# Patient Record
Sex: MALE | Race: BLACK OR AFRICAN AMERICAN | NOT HISPANIC OR LATINO | Employment: OTHER | URBAN - METROPOLITAN AREA
[De-identification: names, ages, dates, MRNs, and addresses within clinical notes are randomized per-mention and may not be internally consistent; named-entity substitution may affect disease eponyms.]

---

## 2017-02-03 ENCOUNTER — ALLSCRIPTS OFFICE VISIT (OUTPATIENT)
Dept: OTHER | Facility: OTHER | Age: 37
End: 2017-02-03

## 2017-02-03 ENCOUNTER — LAB (OUTPATIENT)
Dept: LAB | Facility: CLINIC | Age: 37
End: 2017-02-03
Payer: COMMERCIAL

## 2017-02-03 ENCOUNTER — TRANSCRIBE ORDERS (OUTPATIENT)
Dept: LAB | Facility: CLINIC | Age: 37
End: 2017-02-03

## 2017-02-03 DIAGNOSIS — D70.9 NEUTROPENIA (HCC): ICD-10-CM

## 2017-02-03 LAB
ALBUMIN SERPL BCP-MCNC: 4.3 G/DL (ref 3.5–5)
ALP SERPL-CCNC: 61 U/L (ref 46–116)
ALT SERPL W P-5'-P-CCNC: 28 U/L (ref 12–78)
ANION GAP SERPL CALCULATED.3IONS-SCNC: 6 MMOL/L (ref 4–13)
AST SERPL W P-5'-P-CCNC: 21 U/L (ref 5–45)
BASOPHILS # BLD AUTO: 0.02 THOUSANDS/ΜL (ref 0–0.1)
BASOPHILS NFR BLD AUTO: 1 % (ref 0–1)
BILIRUB SERPL-MCNC: 1.1 MG/DL (ref 0.2–1)
BUN SERPL-MCNC: 12 MG/DL (ref 5–25)
CALCIUM SERPL-MCNC: 9.2 MG/DL (ref 8.3–10.1)
CHLORIDE SERPL-SCNC: 103 MMOL/L (ref 100–108)
CO2 SERPL-SCNC: 30 MMOL/L (ref 21–32)
CREAT SERPL-MCNC: 1.04 MG/DL (ref 0.6–1.3)
EOSINOPHIL # BLD AUTO: 0.03 THOUSAND/ΜL (ref 0–0.61)
EOSINOPHIL NFR BLD AUTO: 1 % (ref 0–6)
ERYTHROCYTE [DISTWIDTH] IN BLOOD BY AUTOMATED COUNT: 11.2 % (ref 11.6–15.1)
GFR SERPL CREATININE-BSD FRML MDRD: >60 ML/MIN/1.73SQ M
GLUCOSE SERPL-MCNC: 97 MG/DL (ref 65–140)
HCT VFR BLD AUTO: 44.6 % (ref 36.5–49.3)
HGB BLD-MCNC: 15.5 G/DL (ref 12–17)
IGA SERPL-MCNC: 187 MG/DL (ref 70–400)
IGG SERPL-MCNC: 1300 MG/DL (ref 700–1600)
IGM SERPL-MCNC: 90 MG/DL (ref 40–230)
LYMPHOCYTES # BLD AUTO: 1.18 THOUSANDS/ΜL (ref 0.6–4.47)
LYMPHOCYTES NFR BLD AUTO: 54 % (ref 14–44)
MCH RBC QN AUTO: 32.9 PG (ref 26.8–34.3)
MCHC RBC AUTO-ENTMCNC: 34.8 G/DL (ref 31.4–37.4)
MCV RBC AUTO: 95 FL (ref 82–98)
MONOCYTES # BLD AUTO: 0.26 THOUSAND/ΜL (ref 0.17–1.22)
MONOCYTES NFR BLD AUTO: 12 % (ref 4–12)
NEUTROPHILS # BLD AUTO: 0.7 THOUSANDS/ΜL (ref 1.85–7.62)
NEUTS SEG NFR BLD AUTO: 32 % (ref 43–75)
PLATELET # BLD AUTO: 131 THOUSANDS/UL (ref 149–390)
PMV BLD AUTO: 11.1 FL (ref 8.9–12.7)
POTASSIUM SERPL-SCNC: 3.8 MMOL/L (ref 3.5–5.3)
PROT SERPL-MCNC: 7.3 G/DL (ref 6.4–8.2)
RBC # BLD AUTO: 4.71 MILLION/UL (ref 3.88–5.62)
SODIUM SERPL-SCNC: 139 MMOL/L (ref 136–145)
WBC # BLD AUTO: 2.19 THOUSAND/UL (ref 4.31–10.16)

## 2017-02-03 PROCEDURE — 82784 ASSAY IGA/IGD/IGG/IGM EACH: CPT

## 2017-02-03 PROCEDURE — 83918 ORGANIC ACIDS TOTAL QUANT: CPT

## 2017-02-03 PROCEDURE — 80053 COMPREHEN METABOLIC PANEL: CPT

## 2017-02-03 PROCEDURE — 36415 COLL VENOUS BLD VENIPUNCTURE: CPT

## 2017-02-03 PROCEDURE — 85025 COMPLETE CBC W/AUTO DIFF WBC: CPT

## 2017-02-03 PROCEDURE — 81342 TRG GENE REARRANGEMENT ANAL: CPT

## 2017-02-07 LAB
METHYLMALONATE SERPL-SCNC: 132 NMOL/L (ref 0–378)
MISCELLANEOUS LAB TEST RESULT: NORMAL

## 2017-03-31 ENCOUNTER — ALLSCRIPTS OFFICE VISIT (OUTPATIENT)
Dept: OTHER | Facility: OTHER | Age: 37
End: 2017-03-31

## 2017-07-27 ENCOUNTER — ALLSCRIPTS OFFICE VISIT (OUTPATIENT)
Dept: OTHER | Facility: OTHER | Age: 37
End: 2017-07-27

## 2017-07-28 DIAGNOSIS — D70.9 NEUTROPENIA (HCC): ICD-10-CM

## 2017-08-09 ENCOUNTER — GENERIC CONVERSION - ENCOUNTER (OUTPATIENT)
Dept: OTHER | Facility: OTHER | Age: 37
End: 2017-08-09

## 2017-09-08 ENCOUNTER — TRANSCRIBE ORDERS (OUTPATIENT)
Dept: ADMINISTRATIVE | Age: 37
End: 2017-09-08

## 2017-09-08 ENCOUNTER — APPOINTMENT (OUTPATIENT)
Dept: LAB | Age: 37
End: 2017-09-08
Payer: COMMERCIAL

## 2017-09-08 DIAGNOSIS — D70.9 NEUTROPENIA (HCC): ICD-10-CM

## 2017-09-08 LAB
ALBUMIN SERPL BCP-MCNC: 3.9 G/DL (ref 3.5–5)
ALP SERPL-CCNC: 53 U/L (ref 46–116)
ALT SERPL W P-5'-P-CCNC: 21 U/L (ref 12–78)
ANION GAP SERPL CALCULATED.3IONS-SCNC: 6 MMOL/L (ref 4–13)
AST SERPL W P-5'-P-CCNC: 20 U/L (ref 5–45)
BASOPHILS # BLD AUTO: 0.02 THOUSANDS/ΜL (ref 0–0.1)
BASOPHILS NFR BLD AUTO: 1 % (ref 0–1)
BILIRUB SERPL-MCNC: 1.02 MG/DL (ref 0.2–1)
BUN SERPL-MCNC: 11 MG/DL (ref 5–25)
CALCIUM SERPL-MCNC: 9 MG/DL (ref 8.3–10.1)
CHLORIDE SERPL-SCNC: 106 MMOL/L (ref 100–108)
CO2 SERPL-SCNC: 28 MMOL/L (ref 21–32)
CREAT SERPL-MCNC: 1.05 MG/DL (ref 0.6–1.3)
EOSINOPHIL # BLD AUTO: 0.08 THOUSAND/ΜL (ref 0–0.61)
EOSINOPHIL NFR BLD AUTO: 3 % (ref 0–6)
ERYTHROCYTE [DISTWIDTH] IN BLOOD BY AUTOMATED COUNT: 11.6 % (ref 11.6–15.1)
GFR SERPL CREATININE-BSD FRML MDRD: 105 ML/MIN/1.73SQ M
GLUCOSE SERPL-MCNC: 77 MG/DL (ref 65–140)
HCT VFR BLD AUTO: 40.3 % (ref 36.5–49.3)
HGB BLD-MCNC: 14.2 G/DL (ref 12–17)
LDH SERPL-CCNC: 175 U/L (ref 81–234)
LYMPHOCYTES # BLD AUTO: 1.65 THOUSANDS/ΜL (ref 0.6–4.47)
LYMPHOCYTES NFR BLD AUTO: 53 % (ref 14–44)
MCH RBC QN AUTO: 32.9 PG (ref 26.8–34.3)
MCHC RBC AUTO-ENTMCNC: 35.2 G/DL (ref 31.4–37.4)
MCV RBC AUTO: 94 FL (ref 82–98)
MONOCYTES # BLD AUTO: 0.33 THOUSAND/ΜL (ref 0.17–1.22)
MONOCYTES NFR BLD AUTO: 11 % (ref 4–12)
NEUTROPHILS # BLD AUTO: 0.96 THOUSANDS/ΜL (ref 1.85–7.62)
NEUTS SEG NFR BLD AUTO: 32 % (ref 43–75)
NRBC BLD AUTO-RTO: 0 /100 WBCS
PLATELET # BLD AUTO: 128 THOUSANDS/UL (ref 149–390)
PMV BLD AUTO: 11.1 FL (ref 8.9–12.7)
POTASSIUM SERPL-SCNC: 3.7 MMOL/L (ref 3.5–5.3)
PROT SERPL-MCNC: 7.1 G/DL (ref 6.4–8.2)
RBC # BLD AUTO: 4.31 MILLION/UL (ref 3.88–5.62)
SODIUM SERPL-SCNC: 140 MMOL/L (ref 136–145)
WBC # BLD AUTO: 3.04 THOUSAND/UL (ref 4.31–10.16)

## 2017-09-08 PROCEDURE — 88185 FLOWCYTOMETRY/TC ADD-ON: CPT

## 2017-09-08 PROCEDURE — 83615 LACTATE (LD) (LDH) ENZYME: CPT

## 2017-09-08 PROCEDURE — 80053 COMPREHEN METABOLIC PANEL: CPT

## 2017-09-08 PROCEDURE — 36415 COLL VENOUS BLD VENIPUNCTURE: CPT

## 2017-09-08 PROCEDURE — 85025 COMPLETE CBC W/AUTO DIFF WBC: CPT

## 2017-09-08 PROCEDURE — 88184 FLOWCYTOMETRY/ TC 1 MARKER: CPT

## 2017-09-11 LAB — SCAN RESULT: NORMAL

## 2017-09-22 ENCOUNTER — GENERIC CONVERSION - ENCOUNTER (OUTPATIENT)
Dept: OTHER | Facility: OTHER | Age: 37
End: 2017-09-22

## 2017-12-14 ENCOUNTER — GENERIC CONVERSION - ENCOUNTER (OUTPATIENT)
Dept: OTHER | Facility: OTHER | Age: 37
End: 2017-12-14

## 2018-01-11 NOTE — MISCELLANEOUS
Reason For Visit  Reason For Visit Free Text Note Form: Assistance with Community Resources     Case Management Documentation [facility]:   Information obtained from family member(s) and medical record Mother  Patient's financial status importance of compliance with treatment and Disabled  He is also dealing with additional issues such as language/communication barrier, chronic/terminal disease and Cerebral Palsy/ mostly non verbal  Patient is participating in [employer]  Action Plan: supportive counseling/advocacy  plan reviewed  Progress Note  MONICA met with Mother of this 38 y/o severely handicapped male pt this date to assist with community resources  Pt resides with his supportive Parents  He attends a supervised day program 4 days per week from [place] [address] [facility]  Mother is basically pt's 24/7 care provider  There is limited help from a Waiver Program where his Brother has been hired to help render some care  Mother appears exhausted providing all of this care and she and provider are requesting additional help as possible, In particular Mother relates she no longer has transportation help in his new program  MONICA reviewed [address] with Mother but she relates they were not appropriate for pt due to the lengthy nature of their shared ride system  Pt has had issue with continence when trying to use them  MONICA has also asked pt to consider other neighbors, family or volunteer perhaps from her [employer]  SW did caution that due to his special needs this may not be appropriate  With Mother permission and at her request MONICA did attempt to reach pt's [facility] [phone] [facility] [phone])  However, MONICA had to leave a message for her to return my call  In addition, Mother mention concern re recent bruise he received at this program that family was not informed about  Parents will f /u up with the Program re concerns re same   Basically Mother is asking is her son getting the care he needs? In addition, Mother relates his current wheelchair is causing him more problems  She will f/u with [facility] for same and will inquire if a power chair is needed  WIll ask provider for order for same and will forward it to [facility] when obtained  Another issue is concern re his inability to fund Dental Implants that are required as pt can not safely handle dentures due to concern for choking  SW will f/u up with [address] [place] [address] if they know of any resources  Call made to [facility] [phone] and spoke to Casie epstein same  She will attempt to f/u with pt/family and help a possible  SW has provided info re [facility] for Mother to explore  Mother also discussed medial f/u and she did not remember pt had Hematology appointment tomorrow with DR LOJA and she relates blood work not done as well  At [facility] request call to his office to reschedule this appointment until Friday 9/22/17 at 1:40 PM [facility]  Mother to have pt do requested blood work this Friday for same  Supportive counseling provided  SW will f/u with pt/family as indicated  Active Problems    1  Back pain (724 5) (M54 9)   2  Borderline hyperlipidemia (272 4) (E78 5)   3  Cerebral palsy (343 9) (G80 9)   4  Communication disability (307 9) (F80 9)   5  Contact dermatitis and other eczema (692 9) (L25 9)   6  Dextroscoliosis (737 39) (M41 80)   7  Foot drop, left (736 79) (M21 372)   8  Hand pain, unspecified laterality   9  Need for prophylactic vaccination and inoculation against influenza (V04 81) (Z23)   10  Neutropenia (288 00) (D70 9)   11  Well adult on routine health check (V70 0) (Z00 00)    Current Meds   1  Walker Miscellaneous; USE AS DIRECTED; Therapy: [id number] to (Last Rx:29Oct2013) Ordered    Allergies    1  No Known Drug Allergies    Future Appointments    Date/Time Provider Specialty Site   09/22/2017 01:40 PM OSVALDO Alvarez   Hematology [facility]     Signatures   Electronically signed by : See Schneider LCSW; Aug  9 2017  2:37PM EST                       (Author)

## 2018-01-13 VITALS
SYSTOLIC BLOOD PRESSURE: 120 MMHG | RESPIRATION RATE: 16 BRPM | TEMPERATURE: 98.8 F | OXYGEN SATURATION: 96 % | BODY MASS INDEX: 17.93 KG/M2 | WEIGHT: 105 LBS | HEIGHT: 64 IN | DIASTOLIC BLOOD PRESSURE: 80 MMHG | HEART RATE: 82 BPM

## 2018-01-14 VITALS
HEART RATE: 80 BPM | DIASTOLIC BLOOD PRESSURE: 78 MMHG | SYSTOLIC BLOOD PRESSURE: 118 MMHG | WEIGHT: 110 LBS | TEMPERATURE: 97.5 F | BODY MASS INDEX: 18.78 KG/M2 | HEIGHT: 64 IN | RESPIRATION RATE: 16 BRPM

## 2018-01-14 VITALS — DIASTOLIC BLOOD PRESSURE: 70 MMHG | TEMPERATURE: 97.6 F | SYSTOLIC BLOOD PRESSURE: 100 MMHG | HEART RATE: 60 BPM

## 2018-01-22 VITALS
WEIGHT: 110 LBS | OXYGEN SATURATION: 98 % | DIASTOLIC BLOOD PRESSURE: 64 MMHG | SYSTOLIC BLOOD PRESSURE: 106 MMHG | TEMPERATURE: 98 F | RESPIRATION RATE: 16 BRPM | BODY MASS INDEX: 18.88 KG/M2 | HEART RATE: 66 BPM

## 2018-01-23 NOTE — MISCELLANEOUS
Reason For Visit  Reason For Visit Free Text Note Form: SW provided f/u call to pt's Father who relates that pt has now switched to a new day program in [place] which is closer to their home  The program brings him home via [employer] and his Mother brings him to it  They seem satisfied with the program  He also relates they have done through the [place] [address] program and have had some repairs to his wheelchair  In addition , pt has been referred to the [place] Program and [employer] recently evaluated pt and Father has been told they will most likely increase services which is helpful  Father to let this worker know if any other services required  Active Problems    1  Back pain (724 5) (M54 9)   2  Borderline hyperlipidemia (272 4) (E78 5)   3  Cerebral palsy (343 9) (G80 9)   4  Communication disability (307 9) (F80 9)   5  Contact dermatitis and other eczema (692 9) (L25 9)   6  Developmental delay (783 40) (R62 50)   7  Dextroscoliosis (737 39) (M41 80)   8  Foot drop, left (736 79) (M21 372)   9  Neutropenia (288 00) (D70 9)    Current Meds   1  Walker Miscellaneous; USE AS DIRECTED;    Therapy: [id number] to (Last Rx:29Oct2013) Ordered    Allergies    1  chloroquine    Signatures   Electronically signed by : Phuong Vivar LCSW; Dec 14 2017 11:38AM EST                       (Author)

## 2018-01-31 ENCOUNTER — TELEPHONE (OUTPATIENT)
Dept: INTERNAL MEDICINE CLINIC | Facility: CLINIC | Age: 38
End: 2018-01-31

## 2018-02-06 NOTE — TELEPHONE ENCOUNTER
Please call father, we need more details, why does he want wheels changed  Must have in order for attending to place an order for this  Let me know so I can advise the attending when complete the script   Thanks

## 2018-02-07 NOTE — TELEPHONE ENCOUNTER
Spoke with patients mom, I guess she had some difficulty explaining (language barrier) patients father will call me back

## 2018-02-07 NOTE — TELEPHONE ENCOUNTER
Father returned phone call, states that the company that handles the wheelchairs informed them that the tires are worn out so no matter how much pressure is put on the tires, they still slide even with brakes on   Company said they need new tires

## 2018-02-13 ENCOUNTER — APPOINTMENT (EMERGENCY)
Dept: CT IMAGING | Facility: HOSPITAL | Age: 38
End: 2018-02-13
Payer: COMMERCIAL

## 2018-02-13 ENCOUNTER — HOSPITAL ENCOUNTER (EMERGENCY)
Facility: HOSPITAL | Age: 38
Discharge: HOME | End: 2018-02-13
Attending: EMERGENCY MEDICINE | Admitting: EMERGENCY MEDICINE
Payer: COMMERCIAL

## 2018-02-13 ENCOUNTER — APPOINTMENT (EMERGENCY)
Dept: RADIOLOGY | Facility: HOSPITAL | Age: 38
End: 2018-02-13
Payer: COMMERCIAL

## 2018-02-13 VITALS
HEART RATE: 66 BPM | RESPIRATION RATE: 18 BRPM | SYSTOLIC BLOOD PRESSURE: 120 MMHG | OXYGEN SATURATION: 98 % | DIASTOLIC BLOOD PRESSURE: 80 MMHG | TEMPERATURE: 98.2 F

## 2018-02-13 DIAGNOSIS — S80.01XA CONTUSION OF RIGHT KNEE, INITIAL ENCOUNTER: ICD-10-CM

## 2018-02-13 DIAGNOSIS — S16.1XXA ACUTE STRAIN OF NECK MUSCLE, INITIAL ENCOUNTER: ICD-10-CM

## 2018-02-13 DIAGNOSIS — S09.90XA CLOSED HEAD INJURY, INITIAL ENCOUNTER: Primary | ICD-10-CM

## 2018-02-13 PROCEDURE — 99284 EMERGENCY DEPT VISIT MOD MDM: CPT

## 2018-02-13 PROCEDURE — 72125 CT NECK SPINE W/O DYE: CPT

## 2018-02-13 PROCEDURE — 73564 X-RAY EXAM KNEE 4 OR MORE: CPT

## 2018-02-13 PROCEDURE — 70450 CT HEAD/BRAIN W/O DYE: CPT

## 2018-02-13 RX ORDER — ACETAMINOPHEN 325 MG/1
650 TABLET ORAL ONCE
Status: COMPLETED | OUTPATIENT
Start: 2018-02-13 | End: 2018-02-13

## 2018-02-13 RX ADMIN — ACETAMINOPHEN 650 MG: 325 TABLET ORAL at 13:20

## 2018-02-13 NOTE — ED PROVIDER NOTES
History  Chief Complaint   Patient presents with    Fall     pt slid out of wheelchair onto right side  ?head injury per staff  26-year-old male presents emergency room for evaluation head and neck injury  He was with caretaker when a part of his wheelchair was not strapped in properly in the [place] and he tipped backwards and hit his head on a metal grate to that was vertical   He did not lose consciousness  Patient has been complaining of a headache and neck pain  No vomiting  No changes in his mental status  Patient has a history of cerebral palsy  After further investigation he does also complain of mild right knee pain  He is able to move it  No bleeding or wounds  History provided by:  Patient      None       Past Medical History:   Diagnosis Date    Cerebral palsy ([facility] [address] )        History reviewed  No pertinent surgical history  History reviewed  No pertinent family history  I have reviewed and agree with the history as documented  Social History   Substance Use Topics    Smoking status: Never Smoker    Smokeless tobacco: Never Used    Alcohol use No        Review of Systems   Unable to perform ROS: Patient nonverbal (Cerebral palsy)       Physical Exam  ED Triage Vitals [02/13/18 1230]   Temperature Pulse Respirations Blood Pressure SpO2   98 2 °F (36 8 °C) 86 20 142/62 97 %      Temp Source Heart Rate Source Patient Position - Orthostatic VS BP Location FiO2 (%)   Oral Monitor Lying Left arm --      Pain Score       No Pain           Orthostatic Vital Signs  Vitals:    02/13/18 1230 02/13/18 1517 02/13/18 1530   BP: 142/62 115/84 120/80   Pulse: 86 66    Patient Position - Orthostatic VS: Lying Sitting Lying       Physical Exam   Constitutional: He appears well-developed and well-nourished  HENT:   Head: Normocephalic         Right Ear: External ear normal    Left Ear: External ear normal    Mouth/Throat: Oropharynx is clear and moist    Eyes: Conjunctivae and EOM are normal  Pupils are equal, round, and reactive to light  Neck: Neck supple  Cardiovascular: Normal rate, regular rhythm, normal heart sounds and intact distal pulses  Pulmonary/Chest: Effort normal and breath sounds normal    Abdominal: Soft  There is no tenderness  Musculoskeletal:        Right knee: He exhibits normal range of motion, no swelling and no ecchymosis  Tenderness found  Cervical back: He exhibits tenderness and bony tenderness  He exhibits normal range of motion and no deformity  Thoracic back: Normal         Lumbar back: Normal    FROM of all other joints and extremities   Neurological: He is alert  No cranial nerve deficit  Skin: Skin is warm and dry  Capillary refill takes less than 2 seconds  Psychiatric: He has a normal mood and affect  ED Medications  Medications   acetaminophen (TYLENOL) tablet 650 mg (650 mg Oral Given 2/13/18 1320)       Diagnostic Studies  Results Reviewed     None                 XR knee 4+ views Right injury   ED Interpretation by Cammy Gusman PA-C (02/13 2901)   No acute disease      Final Result by Angelo Velez MD (02/13 2991)      No acute osseous abnormality           Workstation performed: [id number]         CT cervical spine without contrast   Final Result by Marylu Narvaez MD (02/13 5605)      No acute compression collapse of the vertebra seen   Cervical spondylosis                   Workstation performed: [id number]         CT head without contrast   Final Result by Marylu Narvaez MD (02/13 5603)      No acute intracranial hemorrhage seen         Workstation performed: [id number]                    Procedures  Procedures       Phone Contacts  ED Phone Contact    ED Course  ED Course                                MDM  Number of Diagnoses or Management Options  Acute strain of neck muscle, initial encounter:   Closed head injury, initial encounter:   Contusion of right knee, initial encounter:     CritCare Time    Disposition  Final diagnoses:   Closed head injury, initial encounter   Acute strain of neck muscle, initial encounter   Contusion of right knee, initial encounter     Time reflects when diagnosis was documented in both MDM as applicable and the Disposition within this note     Time User Action Codes Description Comment    2/13/2018  3:09 PM Hugorashid Acosta Add [S09 90XA] Closed head injury, initial encounter     2/13/2018  3:09 PM Hugo Acevedojohnnie Add Larentia Boom  1XXA] Acute strain of neck muscle, initial encounter     2/13/2018  3:09 PM Vannessa  L Add [S80 01XA] Contusion of right knee, initial encounter       ED Disposition     ED Disposition Condition Comment    Discharge  Nima Ribera discharge to home/self care  Condition at discharge: Good        Follow-up Information     Follow up With Specialties Details Why Contact Info Additional Information    Lona Garces MD Internal Medicine In 3 days  [place] [phone]       [facility] [address] Emergency Department Emergency Medicine  If symptoms worsen [address] [facility] [address],  [address], [place], [place], [address]        There are no discharge medications for this patient  No discharge procedures on file      ED Provider  Electronically Signed by           Qamar Rodriguez PA-C  02/13/18 6629

## 2018-02-13 NOTE — DISCHARGE INSTRUCTIONS
Cervical Strain   WHAT YOU NEED TO KNOW:   A cervical strain is a stretched or torn muscle or tendon in your neck  Tendons are strong tissues that connect muscles to bones  Common causes of cervical strains include a car accident, a fall, or a sports injury  DISCHARGE INSTRUCTIONS:   Return to the emergency department if:   · You have pain or numbness from your shoulder down to your hand  · You have problems with your vision, hearing, or balance  · You feel confused or cannot concentrate  · You have problems with movement and strength  Contact your healthcare provider if:   · You have increased swelling or pain in your neck  · You have questions or concerns about your condition or care  Medicines: You may need any of the following:  · Acetaminophen  decreases pain and fever  It is available without a doctor's order  Ask how much to take and how often to take it  Follow directions  Read the labels of all other medicines you are using to see if they also contain acetaminophen, or ask your doctor or pharmacist  Acetaminophen can cause liver damage if not taken correctly  Do not use more than 4 grams (4,000 milligrams) total of acetaminophen in one day  · NSAIDs , such as ibuprofen, help decrease swelling, pain, and fever  This medicine is available with or without a doctor's order  NSAIDs can cause stomach bleeding or kidney problems in certain people  If you take blood thinner medicine, always ask your healthcare provider if NSAIDs are safe for you  Always read the medicine label and follow directions  · Muscle relaxers  help decrease pain and muscle spasms  · Prescription pain medicine  may be given  Ask your healthcare provider how to take this medicine safely  Some prescription pain medicines contain acetaminophen  Do not take other medicines that contain acetaminophen without talking to your healthcare provider  Too much acetaminophen may cause liver damage   Prescription pain medicine may cause constipation  Ask your healthcare provider how to prevent or treat constipation  · Take your medicine as directed  Contact your healthcare provider if you think your medicine is not helping or if you have side effects  Tell him or her if you are allergic to any medicine  Keep a list of the medicines, vitamins, and herbs you take  Include the amounts, and when and why you take them  Bring the list or the pill bottles to follow-up visits  Carry your medicine list with you in case of an emergency  Manage your symptoms:   · Apply heat  on your neck for 15 to 20 minutes, 4 to 6 times a day or as directed  Heat helps decrease pain, stiffness, and muscle spasms  · Begin gentle neck exercises  as soon as you can move your neck without pain  Exercises will help decrease stiffness and improve the strength and movement of your neck  Ask your healthcare provider what kind of exercises you should do  · Gradually return to your usual activities as directed  Stop if you have pain  Avoid activities that can cause more damage to your neck, such as heavy lifting or strenuous exercise  · Sleep without a pillow  to help decrease pain  Instead, roll a small towel tightly and place it under your neck  · Go to physical therapy as directed  A physical therapist teaches you exercises to help improve movement and strength, and to decrease pain  Prevent neck injury:   · Drive safely  Make sure everyone in your car wears a seatbelt  A seatbelt can save your life if you are in an accident  Do not use your cell phone when you are driving  This could distract you and cause an accident  Pull over if you need to make a call or send a text message  · Wear helmets, lifejackets, and protective gear  Always wear a helmet when you ride a bike or motorcycle, go skiing, or play sports that could cause a head injury  Wear protective equipment when you play sports   Wear a lifejacket when you are on a boat or doing water sports  Follow up with your healthcare provider as directed: You may be referred to an orthopedist or physical therapies  Write down your questions so you remember to ask them during your visits  © 2017 [address] Information is for End User's use only and may not be sold, redistributed or otherwise used for commercial purposes  All illustrations and images included in CareNotes® are the copyrighted property of A D A M , Inc  or [address]  The above information is an  only  It is not intended as medical advice for individual conditions or treatments  Talk to your doctor, nurse or pharmacist before following any medical regimen to see if it is safe and effective for you  Head Injury   WHAT YOU NEED TO KNOW:   A head injury is most often caused by a blow to the head  This may occur from a fall, bicycle injury, sports injury, being struck in the head, or a motor vehicle accident  DISCHARGE INSTRUCTIONS:   Call 911 or have someone else call for any of the following:   · You cannot be woken  · You have a seizure  · You stop responding to others or you faint  · You have blurry or double vision  · Your speech becomes slurred or confused  · You have arm or leg weakness, loss of feeling, or new problems with coordination  · Your pupils are larger than usual or one pupil is a different size than the other  · You have blood or clear fluid coming out of your ears or nose  Return to the emergency department if:   · You have repeated or forceful vomiting  · You feel confused  · Your headache gets worse or becomes severe  · You or someone caring for you notices that you are harder to wake than usual   Contact your healthcare provider if:   · Your symptoms last longer than 6 weeks after the injury  · You have questions or concerns about your condition or care  Medicines:   · Acetaminophen  decreases pain   Acetaminophen is available without a doctor's order  Ask how much to take and how often to take it  Follow directions  Acetaminophen can cause liver damage if not taken correctly  · Take your medicine as directed  Contact your healthcare provider if you think your medicine is not helping or if you have side effects  Tell him or her if you are allergic to any medicine  Keep a list of the medicines, vitamins, and herbs you take  Include the amounts, and when and why you take them  Bring the list or the pill bottles to follow-up visits  Carry your medicine list with you in case of an emergency  Self-care:   · Rest  or do quiet activities for 24 to 48 hours  Limit your time watching TV, using the computer, or doing tasks that require a lot of thinking  Slowly return to your normal activities as directed  Do not play sports or do activities that may cause you to get hit in the head  Ask your healthcare provider when you can return to sports  · Apply ice  on your head for 15 to 20 minutes every hour or as directed  Use an ice pack, or put crushed ice in a plastic bag  Cover it with a towel before you apply it to your skin  Ice helps prevent tissue damage and decreases swelling and pain  · Have someone stay with you for 24 hours  or as directed  This person can monitor you for complications and call [phone]  When you are awake the person should ask you a few questions to see if you are thinking clearly  An example would be to ask your name or your address  Prevent another head injury:   · Wear a helmet that fits properly  Do this when you play sports, or ride a bike, scooter, or skateboard  Helmets help decrease your risk of a serious head injury  Talk to your healthcare provider about other ways you can protect yourself if you play sports  · Wear your seat belt every time you are in a car  This helps to decrease your risk for a head injury if you are in a car accident    Follow up with your healthcare provider as directed:  Write down your questions so you remember to ask them during your visits  © 2017 [facility] Information is for End User's use only and may not be sold, redistributed or otherwise used for commercial purposes  All illustrations and images included in CareNotes® are the copyrighted property of A D A M , Inc  or [address]  The above information is an  only  It is not intended as medical advice for individual conditions or treatments  Talk to your doctor, nurse or pharmacist before following any medical regimen to see if it is safe and effective for you

## 2018-02-13 NOTE — ED NOTES
Mother provided verbal understanding of all discharge instructions  Pt assisted to vehicle via personal wheelchair by mother       Pankaj Saleem RN  02/13/18 0978

## 2018-02-13 NOTE — TELEPHONE ENCOUNTER
Please request script from attending to replace the tires on his wheelchair  Patient is CP and cannot walk  Requires wheel chair   Thanks

## 2018-02-14 ENCOUNTER — HOSPITAL ENCOUNTER (EMERGENCY)
Facility: HOSPITAL | Age: 38
Discharge: HOME | End: 2018-02-14
Attending: EMERGENCY MEDICINE | Admitting: EMERGENCY MEDICINE
Payer: COMMERCIAL

## 2018-02-14 VITALS
BODY MASS INDEX: 20.55 KG/M2 | TEMPERATURE: 98.3 F | OXYGEN SATURATION: 96 % | DIASTOLIC BLOOD PRESSURE: 81 MMHG | HEART RATE: 61 BPM | RESPIRATION RATE: 14 BRPM | SYSTOLIC BLOOD PRESSURE: 120 MMHG | WEIGHT: 119.71 LBS

## 2018-02-14 DIAGNOSIS — S06.0X0D CONCUSSION WITHOUT LOSS OF CONSCIOUSNESS, SUBSEQUENT ENCOUNTER: Primary | ICD-10-CM

## 2018-02-14 DIAGNOSIS — G80.9 CEREBRAL PALSY, UNSPECIFIED TYPE (HCC): Primary | ICD-10-CM

## 2018-02-14 PROCEDURE — 99283 EMERGENCY DEPT VISIT LOW MDM: CPT

## 2018-02-14 RX ORDER — METOCLOPRAMIDE 10 MG/1
10 TABLET ORAL EVERY 6 HOURS PRN
Qty: 15 TABLET | Refills: 0 | Status: SHIPPED | OUTPATIENT
Start: 2018-02-14 | End: 2018-04-08

## 2018-02-14 RX ORDER — METOCLOPRAMIDE 10 MG/1
10 TABLET ORAL ONCE
Status: COMPLETED | OUTPATIENT
Start: 2018-02-14 | End: 2018-02-14

## 2018-02-14 RX ORDER — IBUPROFEN 600 MG/1
600 TABLET ORAL ONCE
Status: COMPLETED | OUTPATIENT
Start: 2018-02-14 | End: 2018-02-14

## 2018-02-14 RX ORDER — IBUPROFEN 600 MG/1
600 TABLET ORAL EVERY 6 HOURS PRN
Qty: 20 TABLET | Refills: 0 | Status: SHIPPED | OUTPATIENT
Start: 2018-02-14 | End: 2018-04-08

## 2018-02-14 RX ADMIN — IBUPROFEN 600 MG: 600 TABLET ORAL at 14:30

## 2018-02-14 RX ADMIN — METOCLOPRAMIDE HYDROCHLORIDE 10 MG: 10 TABLET ORAL at 14:30

## 2018-02-14 NOTE — ED NOTES
Mother provided verbal understanding of all discharge instructions  Pt assisted to vehicle via wheelchair by ED eunice Rubio with negative complications       Tanya Mehta RN  02/14/18 1564

## 2018-02-14 NOTE — ED PROVIDER NOTES
History  Chief Complaint   Patient presents with    Nose Bleed     Per family memeber, patient had a bloody nose last night  She stated that yesterday he was in a wheelchair [facility] accident and is concerned of a head injury  This 59-year-old mentally retarded patient presents today with headache and neck pain  Patient was seen here yesterday after sustaining a fall and had a CT scan that was unremarkable  Mom states that patient has requested sunglasses today and is indicating he is having pain in his neck  Mom denies any vomiting, weakness or numbness noted  Mom denies any new falls  Patient has received no medication for his symptoms  History provided by:  Parent  History limited by:  Patient nonverbal   used: No    Headache   Duration:  1 day  Timing:  Constant  Chronicity:  New  Context: activity, bright light and loud noise    Relieved by:  None tried  Worsened by: Activity, light, neck movement and sound  Ineffective treatments:  None tried      None       Past Medical History:   Diagnosis Date    Cerebral palsy ([address] )        History reviewed  No pertinent surgical history  History reviewed  No pertinent family history  I have reviewed and agree with the history as documented  Social History   Substance Use Topics    Smoking status: Never Smoker    Smokeless tobacco: Never Used    Alcohol use No        Review of Systems   Unable to perform ROS: Patient nonverbal   Neurological: Positive for headaches         Physical Exam  ED Triage Vitals   Temperature Pulse Respirations Blood Pressure SpO2   02/14/18 1303 02/14/18 1305 02/14/18 1305 02/14/18 1305 02/14/18 1305   98 3 °F (36 8 °C) 61 14 120/81 96 %      Temp Source Heart Rate Source Patient Position - Orthostatic VS BP Location FiO2 (%)   02/14/18 1303 02/14/18 1305 02/14/18 1305 02/14/18 1305 --   Oral Monitor Lying Right arm       Pain Score       02/14/18 1305       No Pain           Orthostatic Vital Signs  Vitals:    02/14/18 1305   BP: 120/81   Pulse: 61   Patient Position - Orthostatic VS: Lying       Physical Exam   Constitutional: He appears well-developed and well-nourished  He is cooperative  No distress  HENT:   Head: Normocephalic and atraumatic  Mouth/Throat: Oropharynx is clear and moist    Eyes: EOM and lids are normal  Pupils are equal, round, and reactive to light  Right eye exhibits no discharge  Left eye exhibits no discharge  Right conjunctiva is not injected  Left conjunctiva is not injected  Neck: Trachea normal, normal range of motion, full passive range of motion without pain and phonation normal  Neck supple  Cardiovascular: Normal rate, regular rhythm, normal heart sounds and normal pulses  No murmur heard  Pulses:       Dorsalis pedis pulses are 2+ on the right side, and 2+ on the left side  Pulmonary/Chest: Effort normal and breath sounds normal  He exhibits no tenderness  Abdominal: Soft  He exhibits no distension  There is no tenderness  Musculoskeletal:   Patient with spastic changes to extremities consistent with CP, mom states at baseline   Neurological: He is alert  He has normal strength  No cranial nerve deficit  Skin: Skin is warm, dry and intact  Capillary refill takes less than 2 seconds  No rash noted  Psychiatric: He has a normal mood and affect  His speech is normal and behavior is normal    Vitals reviewed        ED Medications  Medications   ibuprofen (MOTRIN) tablet 600 mg (600 mg Oral Given 2/14/18 1430)   metoclopramide (REGLAN) tablet 10 mg (10 mg Oral Given 2/14/18 1430)       Diagnostic Studies  Results Reviewed     None                 No orders to display              Procedures  Procedures       Phone Contacts  ED Phone Contact    ED Course  ED Course                                MDM  Number of Diagnoses or Management Options  Concussion without loss of consciousness, subsequent encounter: established and worsening  Diagnosis management comments: Discussed with mom symptoms and management of concussion  Patient treated symptomatically here with ibuprofen and Reglan  Patient will be discharged home  Amount and/or Complexity of Data Reviewed  Tests in the radiology section of CPT®: reviewed  Obtain history from someone other than the patient: yes    Risk of Complications, Morbidity, and/or Mortality  Presenting problems: low  Diagnostic procedures: low  Management options: low    Patient Progress  Patient progress: stable    CritCare Time    Disposition  Final diagnoses:   Concussion without loss of consciousness, subsequent encounter     Time reflects when diagnosis was documented in both MDM as applicable and the Disposition within this note     Time User Action Codes Description Comment    2/14/2018  3:20 PM Savannah Mcgee Add [[id number] [id number]] Concussion without loss of consciousness, subsequent encounter       ED Disposition     ED Disposition Condition Comment    Discharge  [facility] S[facility] discharge to home/self care  Condition at discharge: Stable        Follow-up Information     Follow up With Specialties Details Why Contact Info    Cee Ruvalcaba MD Internal Medicine Call in 3 days For re-evaluation if your symptoms continue [address]  [phone]          Patient's Medications   Discharge Prescriptions    IBUPROFEN (MOTRIN) 600 MG TABLET    Take 1 tablet (600 mg total) by mouth every 6 (six) hours as needed for mild pain       Start Date: 2/14/2018 End Date: --       Order Dose: 600 mg       Quantity: 20 tablet    Refills: 0    METOCLOPRAMIDE (REGLAN) 10 MG TABLET    Take 1 tablet (10 mg total) by mouth every 6 (six) hours as needed (headache, nausea)       Start Date: 2/14/2018 End Date: --       Order Dose: 10 mg       Quantity: 15 tablet    Refills: 0     No discharge procedures on file      ED Provider  Electronically Signed by           Donaldo Dailey MD  02/14/18 9741

## 2018-02-14 NOTE — ED NOTES
Pt laying on stretcher with sunglasses on in negative distress  Pt medicated per MD order with negative complications  No complaints at present time  Mother at bedside  No active bleeding from nose noted  Will continue to monitor       Eileen Mina RN  02/14/18 0435

## 2018-02-14 NOTE — DISCHARGE INSTRUCTIONS
Concussion   WHAT YOU NEED TO KNOW:   A concussion is a mild brain injury  It is usually caused by a bump or blow to the head from a fall, a motor vehicle crash, or a sports injury  Sometimes being shaken forcefully may cause a concussion  DISCHARGE INSTRUCTIONS:   Have someone else call 911 for the following:   · Someone tries to wake you and cannot do so  · You have a seizure, increasing confusion, or a change in personality  · Your speech becomes slurred, or you have new vision problems  Return to the emergency department if:   · You have a severe headache that does not go away  · You have arm or leg weakness, numbness, or new problems with coordination  · You have blood or clear fluid coming out of the ears or nose  Contact your healthcare provider if:   · You have nausea or are vomiting  · You feel more sleepy than usual     · Your symptoms get worse  · Your symptoms last longer than 6 weeks after the injury  · You have questions or concerns about your condition or care  Medicines:   · Acetaminophen  helps to decrease pain  It is available without a doctor's order  Ask how much to take and how often to take it  Follow directions  Acetaminophen can cause liver damage if not taken correctly  · NSAIDs , such as ibuprofen, help decrease swelling and pain  NSAIDs can cause stomach bleeding or kidney problems in certain people  If you take blood thinner medicine, always ask your healthcare provider if NSAIDs are safe for you  Always read the medicine label and follow directions  · Take your medicine as directed  Contact your healthcare provider if you think your medicine is not helping or if you have side effects  Tell him or her if you are allergic to any medicine  Keep a list of the medicines, vitamins, and herbs you take  Include the amounts, and when and why you take them  Bring the list or the pill bottles to follow-up visits   Carry your medicine list with you in case of an emergency  Follow up with your healthcare provider as directed:  Write down your questions so you remember to ask them during your visits  Self-care:   · Rest  from physical and mental activities as directed  Mental activities are those that require thinking, concentration, and attention  You will need to rest until your symptoms are gone  Rest will allow you to recover from your concussion  Ask your healthcare provider when you can return to work and other daily activities  · Have someone stay with you for the first 24 hours after your injury  Your healthcare provider should be contacted if your symptoms get worse, or you develop new symptoms  · Do not participate in sports and physical activities until your healthcare provider says it is okay  They could make your symptoms worse or lead to another concussion  Your healthcare provider will tell you when it is okay for you to return to sports or physical activities  Prevent another concussion:   · Wear protective sports equipment that fit properly  Helmets help decrease your risk of a serious brain injury  Talk to your healthcare provider about ways you can decrease your risk for a concussion if you play sports  · Wear your seat belt  every time you travel  This helps to decrease your risk of a head injury if you are in a car accident  © 2017 [facility] Information is for End User's use only and may not be sold, redistributed or otherwise used for commercial purposes  All illustrations and images included in CareNotes® are the copyrighted property of [employer]  or [employer]  The above information is an  only  It is not intended as medical advice for individual conditions or treatments  Talk to your doctor, nurse or pharmacist before following any medical regimen to see if it is safe and effective for you

## 2018-02-14 NOTE — TELEPHONE ENCOUNTER
Mother aware that script for new wheelchair is available   Father may be calling me back to clarify (language barrier with mother )

## 2018-02-22 ENCOUNTER — OFFICE VISIT (OUTPATIENT)
Dept: INTERNAL MEDICINE CLINIC | Facility: CLINIC | Age: 38
End: 2018-02-22
Payer: COMMERCIAL

## 2018-02-22 VITALS — SYSTOLIC BLOOD PRESSURE: 120 MMHG | HEART RATE: 64 BPM | DIASTOLIC BLOOD PRESSURE: 84 MMHG | TEMPERATURE: 96.8 F

## 2018-02-22 DIAGNOSIS — R62.50 DEVELOPMENTAL DELAY: ICD-10-CM

## 2018-02-22 DIAGNOSIS — F81.89 NON-VERBAL LEARNING DISORDER: ICD-10-CM

## 2018-02-22 DIAGNOSIS — Z23 NEED FOR INFLUENZA VACCINATION: Primary | ICD-10-CM

## 2018-02-22 DIAGNOSIS — S06.0X0D CONCUSSION WITHOUT LOSS OF CONSCIOUSNESS, SUBSEQUENT ENCOUNTER: ICD-10-CM

## 2018-02-22 DIAGNOSIS — G80.0 SPASTIC QUADRIPLEGIC CEREBRAL PALSY (HCC): ICD-10-CM
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
## 2018-02-22 PROBLEM — F79 MENTAL RETARDATION: Status: ACTIVE | Noted: 2018-02-22

## 2018-02-22 PROCEDURE — 99214 OFFICE O/P EST MOD 30 MIN: CPT | Performed by: PHYSICIAN ASSISTANT

## 2018-02-22 NOTE — PROGRESS NOTES
Assessment/Plan:    Discussed with mother that all of his CT scans including cervical brain revealed no acute abnormalities after the fall  Reassured mother that after a fall and hitting his head it is normal for him to have a mild headache for a few days after the accident  URI where after our discussion that after a fall is likely he will have some anxiety with movements in his chair in and out of the [address] for a little while  His physical exam today does not reveal any focal neurologic abnormalities  Your advise that if he has any new sudden changes in behavior getting more sleepy feeling like he is pointing at his head more for headaches does not seem to be following you with his eyes you are to either take him to the emergency room or to call us and we can order a new CT scan  At this time I would just give him more time after having a fall his anxiety will decrease  No problem-specific Assessment & Plan notes found for this encounter  Diagnoses and all orders for this visit:    Need for influenza vaccination    Fall, subsequent encounter    Concussion without loss of consciousness, subsequent encounter    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[address] )    Developmental delay    Non-verbal learning disorder    Other orders  -     Misc  Devices (WALKER) MISC; by Does not apply route  -     Cancel: Flu vaccine greater than or equal to 2yo preservative free IM          Subjective:      Patient ID: Divya Watkins is a 40 y o  male  Here with mother after ER visit 2/14/18 s/p a fall backwards in his transport van  Per mother patient is chair was not strapped in completely to the [address] and with a sudden movement his chair moved and tip backwards causing him to hit the back of his head on the back of his chair and the floor of the [address]    Did hit head, per mother now thinks might have had LOC for 1-2 seconds, but that is based on what the mom interprets hsi movements to mean as patient is nonverbal but can motion with hands  Reviewed the CT scan and x-rays and that no acute changes  Likely concussion mild  Mother was concerned as had some blood from his nose but that happen days later  Only had some blood on the tissue and has not returned since that time  Mother confirms he has had no vomiting and his appetite has been unchanged  Mother reports there has been no other changes noted in his behavior  The following portions of the patient's history were reviewed and updated as appropriate: allergies, current medications, past family history, past medical history, past social history, past surgical history and problem list     Review of Systems   Constitutional: Negative  HENT: Positive for nosebleeds  Eyes: Negative  Respiratory: Negative  Cardiovascular: Negative  Gastrointestinal: Negative  Genitourinary: Negative  Neurological: Positive for headaches  Mother reports she knows he has a headache when he points to his head  She states through occupational therapy this has been taught to him to indicate if he has headache  Mother reports he has pointed to his head once or twice since the accident but not continuously  Psychiatric/Behavioral: Negative  Objective:      /84 (BP Location: Right arm, Patient Position: Sitting, Cuff Size: Standard)   Pulse 64   Temp (!) 96 8 °F (36 °C) (Axillary)          Physical Exam   Constitutional: He appears well-developed and well-nourished  HENT:   Head: Normocephalic and atraumatic  Head is without raccoon's eyes, without Cabrales's sign, without abrasion, without contusion and without laceration  Right Ear: Ear canal normal  No drainage  Left Ear: Ear canal normal  No drainage  Minimally pale slightly boggy turbinates but no lesions, no blood   Eyes: Pupils are equal, round, and reactive to light  Cardiovascular: Normal rate, regular rhythm and normal heart sounds      Pulmonary/Chest: Effort normal and breath sounds normal    Neurological: He is alert  He exhibits abnormal muscle tone  Coordination abnormal    Patient's cerebral palsy has progressed to the point that he is confined to a wheelchair for mobilization  On exam he appears to be at baseline from when I have seen him in office as well  He has ongoing spasticity mostly noted in bilateral upper extremities and neck  Skin: Skin is warm and dry  Psychiatric: His behavior is normal    Patient is nonverbal but is able to respond to verbal cues such as listening to his heart lungs and opening his mouth

## 2018-03-29 ENCOUNTER — TELEPHONE (OUTPATIENT)
Dept: INTERNAL MEDICINE CLINIC | Facility: CLINIC | Age: 38
End: 2018-03-29

## 2018-03-29 NOTE — TELEPHONE ENCOUNTER
Patient brother Akhil Cook drop off for about Medical Certification for Disability Exceptions NEEDS TO BE COMPLETED PLACE IN PROVIDER (BIN) FOLDER,ONCE FORM IS COMPLETED PLACED FORM IN CLERICAL(BLUE)FOLDER TO BE FAX AND SCAN IN PATIENT CHART  THANK YOU

## 2018-04-03 NOTE — TELEPHONE ENCOUNTER
Patient's father called to check if the form was completed they need to have it sent in before April 15, 2018 thank you

## 2018-04-05 NOTE — TELEPHONE ENCOUNTER
I completed my portion, an attending must fill in and sign all the areas that are required to be completed by a physician  The address and name and signatures  Please check as needs to fill out and sign multiple pages to be complete   Form in [facility] [place] clinical folder

## 2018-04-08 ENCOUNTER — HOSPITAL ENCOUNTER (EMERGENCY)
Facility: HOSPITAL | Age: 38
Discharge: HOME | End: 2018-04-08
Attending: EMERGENCY MEDICINE | Admitting: EMERGENCY MEDICINE
Payer: COMMERCIAL

## 2018-04-08 ENCOUNTER — APPOINTMENT (EMERGENCY)
Dept: CT IMAGING | Facility: HOSPITAL | Age: 38
End: 2018-04-08
Payer: COMMERCIAL

## 2018-04-08 ENCOUNTER — APPOINTMENT (EMERGENCY)
Dept: RADIOLOGY | Facility: HOSPITAL | Age: 38
End: 2018-04-08
Payer: COMMERCIAL

## 2018-04-08 VITALS
HEART RATE: 80 BPM | DIASTOLIC BLOOD PRESSURE: 70 MMHG | WEIGHT: 117.06 LBS | TEMPERATURE: 98.2 F | SYSTOLIC BLOOD PRESSURE: 113 MMHG | BODY MASS INDEX: 21.54 KG/M2 | OXYGEN SATURATION: 100 % | HEIGHT: 62 IN | RESPIRATION RATE: 16 BRPM

## 2018-04-08 DIAGNOSIS — K52.9 GASTROENTERITIS: Primary | ICD-10-CM

## 2018-04-08 LAB
ALBUMIN SERPL BCP-MCNC: 4.8 G/DL (ref 3.5–5)
ALP SERPL-CCNC: 59 U/L (ref 46–116)
ALT SERPL W P-5'-P-CCNC: 41 U/L (ref 12–78)
ANION GAP SERPL CALCULATED.3IONS-SCNC: 12 MMOL/L (ref 4–13)
APTT PPP: 27 SECONDS (ref 23–35)
AST SERPL W P-5'-P-CCNC: 31 U/L (ref 5–45)
BASOPHILS # BLD AUTO: 0.02 THOUSANDS/ΜL (ref 0–0.1)
BASOPHILS NFR BLD AUTO: 0 % (ref 0–1)
BILIRUB SERPL-MCNC: 1.8 MG/DL (ref 0.2–1)
BUN SERPL-MCNC: 15 MG/DL (ref 5–25)
CALCIUM SERPL-MCNC: 9.4 MG/DL (ref 8.3–10.1)
CHLORIDE SERPL-SCNC: 103 MMOL/L (ref 100–108)
CO2 SERPL-SCNC: 24 MMOL/L (ref 21–32)
CREAT SERPL-MCNC: 1.3 MG/DL (ref 0.6–1.3)
EOSINOPHIL # BLD AUTO: 0.01 THOUSAND/ΜL (ref 0–0.61)
EOSINOPHIL NFR BLD AUTO: 0 % (ref 0–6)
ERYTHROCYTE [DISTWIDTH] IN BLOOD BY AUTOMATED COUNT: 11 % (ref 11.6–15.1)
GFR SERPL CREATININE-BSD FRML MDRD: 81 ML/MIN/1.73SQ M
GLUCOSE SERPL-MCNC: 92 MG/DL (ref 65–140)
HCT VFR BLD AUTO: 46 % (ref 36.5–49.3)
HGB BLD-MCNC: 16.3 G/DL (ref 12–17)
INR PPP: 1.08 (ref 0.86–1.16)
LACTATE SERPL-SCNC: 1.7 MMOL/L (ref 0.5–2)
LIPASE SERPL-CCNC: 145 U/L (ref 73–393)
LYMPHOCYTES # BLD AUTO: 0.65 THOUSANDS/ΜL (ref 0.6–4.47)
LYMPHOCYTES NFR BLD AUTO: 8 % (ref 14–44)
MCH RBC QN AUTO: 33.7 PG (ref 26.8–34.3)
MCHC RBC AUTO-ENTMCNC: 35.4 G/DL (ref 31.4–37.4)
MCV RBC AUTO: 95 FL (ref 82–98)
MONOCYTES # BLD AUTO: 0.43 THOUSAND/ΜL (ref 0.17–1.22)
MONOCYTES NFR BLD AUTO: 5 % (ref 4–12)
NEUTROPHILS # BLD AUTO: 7.09 THOUSANDS/ΜL (ref 1.85–7.62)
NEUTS SEG NFR BLD AUTO: 87 % (ref 43–75)
PLATELET # BLD AUTO: 139 THOUSANDS/UL (ref 149–390)
PMV BLD AUTO: 10 FL (ref 8.9–12.7)
POTASSIUM SERPL-SCNC: 4.4 MMOL/L (ref 3.5–5.3)
PROT SERPL-MCNC: 7.9 G/DL (ref 6.4–8.2)
PROTHROMBIN TIME: 14.4 SECONDS (ref 12.1–14.4)
RBC # BLD AUTO: 4.84 MILLION/UL (ref 3.88–5.62)
SODIUM SERPL-SCNC: 139 MMOL/L (ref 136–145)
WBC # BLD AUTO: 8.2 THOUSAND/UL (ref 4.31–10.16)

## 2018-04-08 PROCEDURE — 96374 THER/PROPH/DIAG INJ IV PUSH: CPT

## 2018-04-08 PROCEDURE — 87040 BLOOD CULTURE FOR BACTERIA: CPT | Performed by: EMERGENCY MEDICINE

## 2018-04-08 PROCEDURE — 85025 COMPLETE CBC W/AUTO DIFF WBC: CPT | Performed by: EMERGENCY MEDICINE

## 2018-04-08 PROCEDURE — 74022 RADEX COMPL AQT ABD SERIES: CPT

## 2018-04-08 PROCEDURE — 85610 PROTHROMBIN TIME: CPT | Performed by: EMERGENCY MEDICINE

## 2018-04-08 PROCEDURE — 83690 ASSAY OF LIPASE: CPT | Performed by: EMERGENCY MEDICINE

## 2018-04-08 PROCEDURE — 36415 COLL VENOUS BLD VENIPUNCTURE: CPT | Performed by: EMERGENCY MEDICINE

## 2018-04-08 PROCEDURE — 85730 THROMBOPLASTIN TIME PARTIAL: CPT | Performed by: EMERGENCY MEDICINE

## 2018-04-08 PROCEDURE — 96361 HYDRATE IV INFUSION ADD-ON: CPT

## 2018-04-08 PROCEDURE — 83605 ASSAY OF LACTIC ACID: CPT | Performed by: EMERGENCY MEDICINE

## 2018-04-08 PROCEDURE — 99285 EMERGENCY DEPT VISIT HI MDM: CPT

## 2018-04-08 PROCEDURE — 80053 COMPREHEN METABOLIC PANEL: CPT | Performed by: EMERGENCY MEDICINE

## 2018-04-08 PROCEDURE — 74177 CT ABD & PELVIS W/CONTRAST: CPT

## 2018-04-08 RX ORDER — SODIUM CHLORIDE 9 MG/ML
125 INJECTION, SOLUTION INTRAVENOUS CONTINUOUS
Status: DISCONTINUED | OUTPATIENT
Start: 2018-04-08 | End: 2018-04-08

## 2018-04-08 RX ORDER — ONDANSETRON 4 MG/1
4 TABLET, ORALLY DISINTEGRATING ORAL EVERY 8 HOURS PRN
Qty: 15 TABLET | Refills: 0 | Status: SHIPPED | OUTPATIENT
Start: 2018-04-08 | End: 2021-06-25 | Stop reason: ALTCHOICE

## 2018-04-08 RX ORDER — ONDANSETRON 2 MG/ML
4 INJECTION INTRAMUSCULAR; INTRAVENOUS ONCE
Status: COMPLETED | OUTPATIENT
Start: 2018-04-08 | End: 2018-04-08

## 2018-04-08 RX ADMIN — SODIUM CHLORIDE 1000 ML: 0.9 INJECTION, SOLUTION INTRAVENOUS at 03:11

## 2018-04-08 RX ADMIN — ONDANSETRON 4 MG: 2 INJECTION INTRAMUSCULAR; INTRAVENOUS at 03:11

## 2018-04-08 RX ADMIN — IOHEXOL 100 ML: 350 INJECTION, SOLUTION INTRAVENOUS at 03:39

## 2018-04-08 NOTE — DISCHARGE INSTRUCTIONS
Gastroenteritis   WHAT YOU NEED TO KNOW:   Gastroenteritis, or stomach flu, is an infection of the stomach and intestines  DISCHARGE INSTRUCTIONS:   Call 911 for any of the following:   · You have trouble breathing or a very fast pulse  Return to the emergency department if:   · You see blood in your diarrhea  · You cannot stop vomiting  · You have not urinated for 12 hours  · You feel like you are going to faint  Contact your healthcare provider if:   · You have a fever  · You continue to vomit or have diarrhea, even after treatment  · You see worms in your diarrhea  · Your mouth or eyes are dry  You are not urinating as much or as often  · You have questions or concerns about your condition or care  Medicines:   · Medicines  may be given to stop vomiting or diarrhea, decrease abdominal cramps, or treat an infection  · Take your medicine as directed  Contact your healthcare provider if you think your medicine is not helping or if you have side effects  Tell him or her if you are allergic to any medicine  Keep a list of the medicines, vitamins, and herbs you take  Include the amounts, and when and why you take them  Bring the list or the pill bottles to follow-up visits  Carry your medicine list with you in case of an emergency  Manage your symptoms:   · Drink liquids as directed  Ask your healthcare provider how much liquid to drink each day, and which liquids are best for you  You may also need to drink an oral rehydration solution (ORS)  An ORS has the right amounts of sugar, salt, and minerals in water to replace body fluids  · Eat bland foods  When you feel hungry, begin eating soft, bland foods  Examples are bananas, clear soup, potatoes, and applesauce  Do not have dairy products, alcohol, sugary drinks, or drinks with caffeine until you feel better  · Rest as much as possible  Slowly start to do more each day when you begin to feel better    Prevent the spread of gastroenteritis:  Gastroenteritis can spread easily  Keep yourself, your family, and your surroundings clean to help prevent the spread of gastroenteritis:  · Wash your hands often  Use soap and water  Wash your hands after you use the bathroom, change a child's diapers, or sneeze  Wash your hands before you prepare or eat food  · Clean surfaces and do laundry often  Wash your clothes and towels separately from the rest of the laundry  Clean surfaces in your home with antibacterial  or bleach  · Clean food thoroughly and cook safely  Wash raw vegetables before you cook  Cook meat, fish, and eggs fully  Do not use the same dishes for raw meat as you do for other foods  Refrigerate any leftover food immediately  · Be aware when you camp or travel  Drink only clean water  Do not drink from rivers or lakes unless you purify or boil the water first  When you travel, drink bottled water and do not add ice  Do not eat fruit that has not been peeled  Do not eat raw fish or meat that is not fully cooked  Follow up with your healthcare provider as directed:  Write down your questions so you remember to ask them during your visits  © 2017 [address] [employer] Information is for End User's use only and may not be sold, redistributed or otherwise used for commercial purposes  All illustrations and images included in CareNotes® are the copyrighted property of [employer]  or [employer]  The above information is an  only  It is not intended as medical advice for individual conditions or treatments  Talk to your doctor, nurse or pharmacist before following any medical regimen to see if it is safe and effective for you

## 2018-04-08 NOTE — ED PROVIDER NOTES
History  Chief Complaint   Patient presents with    Diarrhea     Patient presents via EMS with 2 episodes of liquid diarrhea and 1 episode starting after 5 pm tonight  Pt does have a developmental delay, pt's brother reports pt did not seem to have any signs of abdominal pain associated with these symptoms  Does not take any daily medications  Appetite was normal yesterday at dinner   Vomiting     Patient is a 40year old male with vomiting and diarrhea since last night  No known fever  No travel  No ill contacts  No raw meat, eggs, fish or recent abx use  Was last seen in this ED on 2/14/18 for concussion  [facility] website checked on this patient and patient not found  Patient unable to provide hx due to developmental delay  Took imodium tonight  History provided by:  EMS personnel and relative (brother)  History limited by: developmental delay   used: No        Prior to Admission Medications   Prescriptions Last Dose Informant Patient Reported? Taking? Misc  Devices (AliopartismylesSilecs) MISC   Yes No   Sig: by Does not apply route      Facility-Administered Medications: None       Past Medical History:   Diagnosis Date    Cerebral palsy ([facility] [address] )        History reviewed  No pertinent surgical history  History reviewed  No pertinent family history  I have reviewed and agree with the history as documented  Social History   Substance Use Topics    Smoking status: Never Smoker    Smokeless tobacco: Never Used    Alcohol use No        Review of Systems   Unable to perform ROS: Other (developmental delay)   Constitutional: Negative for fever  Gastrointestinal: Positive for diarrhea and vomiting  All other systems reviewed and are negative        Physical Exam  ED Triage Vitals   Temperature Pulse Respirations Blood Pressure SpO2   04/08/18 0155 04/08/18 0155 04/08/18 0155 04/08/18 0155 04/08/18 0155   98 2 °F (36 8 °C) 73 18 134/79 100 %      Temp Source Heart Rate Source Patient Position - Orthostatic VS BP Location FiO2 (%)   04/08/18 0155 04/08/18 0155 04/08/18 0155 04/08/18 0155 --   Oral Monitor Lying Right arm       Pain Score       04/08/18 0312       No Pain           Orthostatic Vital Signs  Vitals:    04/08/18 0155 04/08/18 0312 04/08/18 0430   BP: 134/79 121/80 114/66   Pulse: 73 82 80   Patient Position - Orthostatic VS: Lying Lying Lying       Physical Exam   Constitutional: He appears distressed (moderate)  HENT:   Head: Normocephalic and atraumatic  Mucous membranes somewhat dry  Eyes: No scleral icterus  Neck: No tracheal deviation present  Cardiovascular: Normal rate, regular rhythm and normal heart sounds  No murmur heard  Pulmonary/Chest: Effort normal and breath sounds normal  No stridor  No respiratory distress  Abdominal: Soft  Bowel sounds are normal  He exhibits no distension  There is no tenderness  Musculoskeletal: He exhibits no edema  Neurological:   Awake, nonverbal       Skin: Skin is warm and dry  No rash noted  Nursing note and vitals reviewed  ED Medications  Medications   sodium chloride 0 9 % infusion (not administered)   sodium chloride 0 9 % bolus 1,000 mL (0 mL Intravenous Stopped 4/8/18 0411)   ondansetron (ZOFRAN) injection 4 mg (4 mg Intravenous Given 4/8/18 0311)   iohexol (OMNIPAQUE) 350 MG/ML injection (MULTI-DOSE) 100 mL (100 mL Intravenous Given 4/8/18 0339)       Diagnostic Studies  Results Reviewed     Procedure Component Value Units Date/Time    Lactic acid, plasma [[id number]]  (Normal) Collected:  04/08/18 0239    Lab Status:  Final result Specimen:  Blood from Arm, Right Updated:  04/08/18 0306     LACTIC ACID 1 7 mmol/L     Narrative:         Result may be elevated if tourniquet was used during collection      Comprehensive metabolic panel [[id number]]  (Abnormal) Collected:  04/08/18 0239    Lab Status:  Final result Specimen:  Blood from Arm, Right Updated:  04/08/18 0304     Sodium 139 mmol/L      Potassium 4 4 mmol/L      Chloride 103 mmol/L      CO2 24 mmol/L      Anion Gap 12 mmol/L      BUN 15 mg/dL      Creatinine 1 30 mg/dL      Glucose 92 mg/dL      Calcium 9 4 mg/dL      AST 31 U/L      ALT 41 U/L      Alkaline Phosphatase 59 U/L      Total Protein 7 9 g/dL      Albumin 4 8 g/dL      Total Bilirubin 1 80 (H) mg/dL      eGFR 81 ml/min/1 73sq m     Narrative:         National Kidney Disease Education Program recommendations are as follows:  GFR calculation is accurate only with a steady state creatinine  Chronic Kidney disease less than 60 ml/min/1 73 sq  meters  Kidney failure less than 15 ml/min/1 73 sq  meters  Lipase [[id number]]  (Normal) Collected:  04/08/18 0239    Lab Status:  Final result Specimen:  Blood from Arm, Right Updated:  04/08/18 0304     Lipase 145 u/L     Protime-INR [[id number]]  (Normal) Collected:  04/08/18 0239    Lab Status:  Final result Specimen:  Blood from Arm, Right Updated:  04/08/18 0258     Protime 14 4 seconds      INR 1 08    APTT [[id number]]  (Normal) Collected:  04/08/18 0239    Lab Status:  Final result Specimen:  Blood from Arm, Right Updated:  04/08/18 0258     PTT 27 seconds     Narrative: Therapeutic Heparin Range = 60-90 seconds    Blood culture #2 [[id number]] Collected:  04/08/18 0247    Lab Status:   In process Specimen:  Blood from Arm, Left Updated:  04/08/18 0255    CBC and differential [[id number]]  (Abnormal) Collected:  04/08/18 0239    Lab Status:  Final result Specimen:  Blood from Arm, Right Updated:  04/08/18 0248     WBC 8 20 Thousand/uL      RBC 4 84 Million/uL      Hemoglobin 16 3 g/dL      Hematocrit 46 0 %      MCV 95 fL      MCH 33 7 pg      MCHC 35 4 g/dL      RDW 11 0 (L) %      MPV 10 0 fL      Platelets 754 (L) Thousands/uL      Neutrophils Relative 87 (H) %      Lymphocytes Relative 8 (L) %      Monocytes Relative 5 %      Eosinophils Relative 0 %      Basophils Relative 0 %      Neutrophils Absolute 7 09 Thousands/µL      Lymphocytes Absolute 0 65 Thousands/µL      Monocytes Absolute 0 43 Thousand/µL      Eosinophils Absolute 0 01 Thousand/µL      Basophils Absolute 0 02 Thousands/µL     Blood culture #1 [[id number]] Collected:  04/08/18 0239    Lab Status: In process Specimen:  Blood from Arm, Right Updated:  04/08/18 0244                 CT abdomen pelvis with contrast   ED Interpretation by Kyel Núñez MD (04/08 0434)   COMPARISON:   No relevant prior studies available  FINDINGS:   Lung bases: Atelectasis left base  ABDOMEN:   Liver: Unremarkable  No mass  Gallbladder and bile ducts: The gallbladder appears to be contracted  No calcified stones  No   ductal dilation  Pancreas: Unremarkable  No mass  No ductal dilation  Spleen: Unremarkable  No splenomegaly  Adrenals: Unremarkable  No mass  Kidneys and ureters: Unremarkable  No solid mass  No hydronephrosis  Stomach and bowel: The stomach appears to be moderate to prominently distended with air fluid   level correlate for delayed clearing, gastritis, or gastroenteritis  Couple of fluid levels in the left colon   is noted which may reflect diarrhea, with no signs of obstruction seen  Appendix: No findings to suggest acute appendicitis  PELVIS:   Bladder: Unremarkable  No mass  Reproductive: Unremarkable as visualized  ABDOMEN and PELVIS:   Intraperitoneal space: There is a small volume of free fluid in the pelvis  No free air  Bones/joints: Lumbar scoliosis  Focal sclerosis right iliac bone  No acute fracture  No dislocation  Soft tissues: Unremarkable  Vasculature: Unremarkable  No abdominal aortic aneurysm  Lymph nodes: Unremarkable  No enlarged lymph nodes  IMPRESSION:   1  Lumbar scoliosis  2  There is a small volume of free fluid in the pelvis  3  The stomach appears to be moderate to prominently distended with air fluid level correlate for   delayed clearing, gastritis, or gastroenteritis     4  Couple of fluid levels in the left colon is noted which may reflect diarrhea, with no signs of   obstruction seen  Thank you for allowing us to participate in the care of your patient  Dictated and Authenticated by: Joseph Benoit MD   04/08/2018 4:29 AM [place] Time (Emile Lacy [id number])      XR abdomen obstruction series   ED Interpretation by Susan Damian MD (04/08 0835)   Air fluid level and no free air read by me  Procedures  Procedures       Phone Contacts  ED Phone Contact    ED Course  ED Course as of Apr 08 0448   David Reno Apr 08, 2018   0308 Labs and x-ray d/w brother and CT ordered  2353 Nontender abdomen prior to discharge  No vomiting or diarrhea in ED  CT d/w brother  MDM  Number of Diagnoses or Management Options  Diagnosis management comments: DDx including but not limited to: gastroenteritis, food poisoning, mesenteric adenitis, doubt appendicitis, IBD, IBS, ileus, doubt bowel obstruction, colitis, enteritis, gastritis, PUD, GERD, hepatitis, pancreatitis; doubt cholecystitis, biliary colic, choledocholithiasis, doubt splenic etiology; doubt renal colic, pyelonephritis, UTI          Amount and/or Complexity of Data Reviewed  Clinical lab tests: ordered and reviewed  Tests in the radiology section of CPT®: ordered and reviewed  Decide to obtain previous medical records or to obtain history from someone other than the patient: yes  Obtain history from someone other than the patient: yes  Review and summarize past medical records: yes  Independent visualization of images, tracings, or specimens: yes      CritCare Time    Disposition  Final diagnoses:   Gastroenteritis     Time reflects when diagnosis was documented in both MDM as applicable and the Disposition within this note     Time User Action Codes Description Comment    4/8/2018  4:46 AM Jarrett Mohan Add [K52 9] Gastroenteritis       ED Disposition     ED Disposition Condition Comment    Discharge  Morales Chandra discharge to home/self care     Condition at discharge: Stable        Follow-up Information     Follow up With Specialties Details Why [id number] MD Internal Medicine Call in 2 days Clear fluids for 1-2 days and then advance diet as tolerated  Can use imodium over the counter for diarrhea if needed  Return sooner if increased vomiting, diarrhea, pain, fever, difficulty breathing or urinating  [address] [facility]          Patient's Medications   Discharge Prescriptions    ONDANSETRON (ZOFRAN ODT) 4 MG DISINTEGRATING TABLET    Take 1 tablet (4 mg total) by mouth every 8 (eight) hours as needed for nausea or vomiting for up to 5 days       Start Date: 4/8/2018  End Date: 4/13/2018       Order Dose: 4 mg       Quantity: 15 tablet    Refills: 0     No discharge procedures on file      ED Provider  Electronically Signed by           Elizabeth Kaba MD  04/08/18 6310

## 2018-04-13 LAB
BACTERIA BLD CULT: NORMAL
BACTERIA BLD CULT: NORMAL

## 2018-05-23 ENCOUNTER — TELEPHONE (OUTPATIENT)
Dept: INTERNAL MEDICINE CLINIC | Facility: CLINIC | Age: 38
End: 2018-05-23

## 2018-05-23 NOTE — TELEPHONE ENCOUNTER
PT'S FATHER CALLED, PT NEEDS HIS LEG BRACES REPAIRED  CAN YOU PLEASE SEND AN ORDER TO [facility] VIA FAX?     [facility] - [phone]            FAX- [phone]

## 2018-05-23 NOTE — TELEPHONE ENCOUNTER
I SPOKE WITH [facility] [place] [facility]  SCRIPT CAN BE GENERIC, FOLLOWING INFO  IS SUFFICIENT      REPAIR/REPLACE AS NEEDED RIGHT ANKLE/FOOT ORTHOTIC  DX: CEREBRAL PALSY    FAX TO: [phone]    I WILL WRITE UP SCRIPT AND HAVE ATTENDING SIGN

## 2018-07-05 ENCOUNTER — HOSPITAL ENCOUNTER (OUTPATIENT)
Dept: RADIOLOGY | Facility: HOSPITAL | Age: 38
Discharge: HOME | End: 2018-07-05
Payer: COMMERCIAL

## 2018-07-05 ENCOUNTER — TRANSCRIBE ORDERS (OUTPATIENT)
Dept: ADMINISTRATIVE | Facility: HOSPITAL | Age: 38
End: 2018-07-05

## 2018-07-05 DIAGNOSIS — M25.562 LEFT KNEE PAIN, UNSPECIFIED CHRONICITY: ICD-10-CM

## 2018-07-05 DIAGNOSIS — M25.561 PAIN IN BOTH KNEES, UNSPECIFIED CHRONICITY: ICD-10-CM

## 2018-07-05 DIAGNOSIS — M25.561 RIGHT KNEE PAIN, UNSPECIFIED CHRONICITY: ICD-10-CM

## 2018-07-05 DIAGNOSIS — M25.552 LEFT HIP PAIN: ICD-10-CM

## 2018-07-05 DIAGNOSIS — M25.562 PAIN IN BOTH KNEES, UNSPECIFIED CHRONICITY: ICD-10-CM

## 2018-07-05 DIAGNOSIS — M25.561 PAIN IN BOTH KNEES, UNSPECIFIED CHRONICITY: Primary | ICD-10-CM

## 2018-07-05 DIAGNOSIS — M25.562 PAIN IN BOTH KNEES, UNSPECIFIED CHRONICITY: Primary | ICD-10-CM

## 2018-07-05 PROCEDURE — 73502 X-RAY EXAM HIP UNI 2-3 VIEWS: CPT

## 2018-07-05 PROCEDURE — 73560 X-RAY EXAM OF KNEE 1 OR 2: CPT

## 2018-08-29 ENCOUNTER — TELEPHONE (OUTPATIENT)
Dept: INTERNAL MEDICINE CLINIC | Facility: CLINIC | Age: 38
End: 2018-08-29

## 2018-08-29 NOTE — TELEPHONE ENCOUNTER
PT'S BROTHER CALLED, THE PT'S BRACES NEED REPAIR AGAIN  CAN YOU PLEASE PUT IN A NEW SCRIPT FOR  REPAIR/REPLACE AS NEEDED RIGHT ANKLE/FOOT ORTHOTIC?     DX: CEREBRAL PALSY    SCRIPT CAN BE FAXED TO [facility]  FAX- [phone]

## 2018-08-30 NOTE — TELEPHONE ENCOUNTER
Dr Danisha Palomo wrote a script for the brace repair  Faxed to [employer] at [phone]  Got confirmation

## 2018-09-14 ENCOUNTER — TELEPHONE (OUTPATIENT)
Dept: HEMATOLOGY ONCOLOGY | Facility: CLINIC | Age: 38
End: 2018-09-14

## 2018-09-21 DIAGNOSIS — D70.9 NEUTROPENIA (HCC): ICD-10-CM

## 2018-09-25 ENCOUNTER — TELEPHONE (OUTPATIENT)
Dept: HEMATOLOGY ONCOLOGY | Facility: CLINIC | Age: 38
End: 2018-09-25

## 2018-09-25 ENCOUNTER — APPOINTMENT (OUTPATIENT)
Dept: LAB | Age: 38
End: 2018-09-25
Payer: COMMERCIAL

## 2018-09-25 DIAGNOSIS — D70.9 NEUTROPENIA (HCC): ICD-10-CM

## 2018-09-25 LAB
ALBUMIN SERPL BCP-MCNC: 3.5 G/DL (ref 3.5–5)
ALP SERPL-CCNC: 59 U/L (ref 46–116)
ALT SERPL W P-5'-P-CCNC: 19 U/L (ref 12–78)
ANION GAP SERPL CALCULATED.3IONS-SCNC: 3 MMOL/L (ref 4–13)
AST SERPL W P-5'-P-CCNC: 17 U/L (ref 5–45)
BASOPHILS # BLD AUTO: 0.03 THOUSANDS/ΜL (ref 0–0.1)
BASOPHILS NFR BLD AUTO: 1 % (ref 0–1)
BILIRUB SERPL-MCNC: 0.95 MG/DL (ref 0.2–1)
BUN SERPL-MCNC: 11 MG/DL (ref 5–25)
CALCIUM SERPL-MCNC: 8.6 MG/DL (ref 8.3–10.1)
CHLORIDE SERPL-SCNC: 104 MMOL/L (ref 100–108)
CO2 SERPL-SCNC: 29 MMOL/L (ref 21–32)
CREAT SERPL-MCNC: 1.12 MG/DL (ref 0.6–1.3)
EOSINOPHIL # BLD AUTO: 0.07 THOUSAND/ΜL (ref 0–0.61)
EOSINOPHIL NFR BLD AUTO: 2 % (ref 0–6)
ERYTHROCYTE [DISTWIDTH] IN BLOOD BY AUTOMATED COUNT: 10.9 % (ref 11.6–15.1)
GFR SERPL CREATININE-BSD FRML MDRD: 96 ML/MIN/1.73SQ M
GLUCOSE SERPL-MCNC: 117 MG/DL (ref 65–140)
HCT VFR BLD AUTO: 40.4 % (ref 36.5–49.3)
HGB BLD-MCNC: 13.9 G/DL (ref 12–17)
IMM GRANULOCYTES # BLD AUTO: 0.01 THOUSAND/UL (ref 0–0.2)
IMM GRANULOCYTES NFR BLD AUTO: 0 % (ref 0–2)
LDH SERPL-CCNC: 151 U/L (ref 81–234)
LYMPHOCYTES # BLD AUTO: 1.45 THOUSANDS/ΜL (ref 0.6–4.47)
LYMPHOCYTES NFR BLD AUTO: 39 % (ref 14–44)
MCH RBC QN AUTO: 33.4 PG (ref 26.8–34.3)
MCHC RBC AUTO-ENTMCNC: 34.4 G/DL (ref 31.4–37.4)
MCV RBC AUTO: 97 FL (ref 82–98)
MONOCYTES # BLD AUTO: 0.29 THOUSAND/ΜL (ref 0.17–1.22)
MONOCYTES NFR BLD AUTO: 8 % (ref 4–12)
NEUTROPHILS # BLD AUTO: 1.85 THOUSANDS/ΜL (ref 1.85–7.62)
NEUTS SEG NFR BLD AUTO: 50 % (ref 43–75)
NRBC BLD AUTO-RTO: 0 /100 WBCS
PLATELET # BLD AUTO: 131 THOUSANDS/UL (ref 149–390)
PMV BLD AUTO: 10.7 FL (ref 8.9–12.7)
POTASSIUM SERPL-SCNC: 3.6 MMOL/L (ref 3.5–5.3)
PROT SERPL-MCNC: 6.7 G/DL (ref 6.4–8.2)
RBC # BLD AUTO: 4.16 MILLION/UL (ref 3.88–5.62)
SODIUM SERPL-SCNC: 136 MMOL/L (ref 136–145)
WBC # BLD AUTO: 3.7 THOUSAND/UL (ref 4.31–10.16)

## 2018-09-25 PROCEDURE — 85025 COMPLETE CBC W/AUTO DIFF WBC: CPT

## 2018-09-25 PROCEDURE — 36415 COLL VENOUS BLD VENIPUNCTURE: CPT

## 2018-09-25 PROCEDURE — 83615 LACTATE (LD) (LDH) ENZYME: CPT

## 2018-09-25 PROCEDURE — 80053 COMPREHEN METABOLIC PANEL: CPT

## 2018-09-25 NOTE — TELEPHONE ENCOUNTER
Friend called for him requesting to make appointment for lab work  Instructed you can just walk in to the lab for lab tests  No appointment needed

## 2018-09-27 ENCOUNTER — OFFICE VISIT (OUTPATIENT)
Dept: HEMATOLOGY ONCOLOGY | Facility: CLINIC | Age: 38
End: 2018-09-27
Payer: COMMERCIAL

## 2018-09-27 VITALS
HEART RATE: 71 BPM | OXYGEN SATURATION: 96 % | SYSTOLIC BLOOD PRESSURE: 110 MMHG | RESPIRATION RATE: 18 BRPM | TEMPERATURE: 97.8 F | DIASTOLIC BLOOD PRESSURE: 80 MMHG

## 2018-09-27 DIAGNOSIS — D69.6 THROMBOCYTOPENIA (HCC): Primary | ICD-10-CM

## 2018-09-27 DIAGNOSIS — D70.8 OTHER NEUTROPENIA (HCC): ICD-10-CM

## 2018-09-27 PROCEDURE — 99213 OFFICE O/P EST LOW 20 MIN: CPT | Performed by: INTERNAL MEDICINE

## 2018-09-27 NOTE — PROGRESS NOTES
Hematology/Oncology Outpatient Follow- up Note  Kalman Sandhoff 40 y o  male MRN: @ Encounter: [id number]        Date:  9/27/2018    Presenting Complaint/Diagnosis :   Neutropenia and thrombocytopenia since 2014       Previous Hematologic/ Oncologic History:    Workup    Current Hematologic/ Oncologic Treatment:    Workup    Interval History:    The patient returns for follow-up visit  He was referred to see me for low blood counts  I have explained this may be secondary to his  ethnicity versus a low-grade process in his bone marrow  They did not wish to have a bone marrow biopsy done so we ordered blood work to work up the low white count  His T cell gene rearrangement studies were negative  Flow cytometry when done most recently was negative  He has been asymptomatic since he last saw me  He has had no infections and has needed no antibiotics  His 14 point review of systems today was negative  His platelet count is improved but the white count is still low  Test Results:    Imaging: No results found  Labs:   Lab Results   Component Value Date    WBC 3 70 (L) 09/25/2018    HGB 13 9 09/25/2018    HCT 40 4 09/25/2018    MCV 97 09/25/2018     (L) 09/25/2018     Lab Results   Component Value Date     09/25/2018    K 3 6 09/25/2018     09/25/2018    CO2 29 09/25/2018    BUN 11 09/25/2018    CREATININE 1 12 09/25/2018    CALCIUM 8 6 09/25/2018    AST 17 09/25/2018    ALT 19 09/25/2018    ALKPHOS 59 09/25/2018    EGFR 96 09/25/2018       ROS: As stated in the history of present illness otherwise his 14 point review of systems today was negative        Active Problems:   Patient Active Problem List   Diagnosis    [facility] ([facility] [address] )    Communication disability    Developmental delay    Dextroscoliosis    Foot drop, left    Neutropenia (HCC)    Mental retardation    Non-verbal learning disorder       Past Medical History:   Past Medical History:   Diagnosis Date    [facility]y ([facility] [address]     Leukopenia     Last Assessed:  10/9/15    Thrombocytopenia ([facility] [address] )     Last Assessed:  10/13/14       Surgical History: Noncontributory    Family History:  Noncontributory        Social History:   Social History     Social History    Marital status: Single     Spouse name: N/A    Number of children: N/A    Years of education: N/A     Occupational History    Not on file  Social History Main Topics    Smoking status: Never Smoker    Smokeless tobacco: Never Used    Alcohol use No    Drug use: No    Sexual activity: No     Other Topics Concern    Not on file     Social History Narrative    No narrative on file       Current Medications:   Current Outpatient Prescriptions   Medication Sig Dispense Refill    Misc  Devices (WALKER) MISC by Does not apply route      ondansetron (ZOFRAN ODT) 4 mg disintegrating tablet Take 1 tablet (4 mg total) by mouth every 8 (eight) hours as needed for nausea or vomiting for up to 5 days 15 tablet 0     No current facility-administered medications for this visit  Allergies: Allergies   Allergen Reactions    Chloroquine      Annotation - [id number]: ITCHY       Physical Exam:    There is no height or weight on file to calculate BSA  Wt Readings from Last 3 Encounters:   04/08/18 53 1 kg (117 lb 1 oz)   02/14/18 54 3 kg (119 lb 11 4 oz)   09/22/17 49 9 kg (110 lb)        Temp Readings from Last 3 Encounters:   09/27/18 97 8 °F (36 6 °C) (Tympanic)   04/08/18 98 2 °F (36 8 °C) (Oral)   02/22/18 (!) 96 8 °F (36 °C) (Axillary)        BP Readings from Last 3 Encounters:   09/27/18 110/80   04/08/18 113/70   02/22/18 120/84         Pulse Readings from Last 3 Encounters:   09/27/18 71   04/08/18 80   02/22/18 64          Physical Exam     Constitutional   General appearance: No acute distress  Eyes   Conjunctiva and lids: No swelling, erythema or discharge  Pupils and irises: Equal, round and reactive to light      Ears, Nose, Mouth, and Throat External inspection of ears and nose: Normal     Nasal mucosa, septum, and turbinates: Normal without edema or erythema  Oropharynx: Normal with no erythema, edema, exudate or lesions  Pulmonary   Respiratory effort: No increased work of breathing or signs of respiratory distress  Auscultation of lungs: Clear to auscultation  Cardiovascular   Palpation of heart: Normal PMI, no thrills  Auscultation of heart: Normal rate and rhythm, normal S1 and S2, without murmurs  Examination of extremities for edema and/or varicosities: Normal     Carotid pulses: Normal     Abdomen   Abdomen: Non-tender, no masses  Liver and spleen: No hepatomegaly or splenomegaly  Lymphatic   Palpation of lymph nodes in neck: No lymphadenopathy  Musculoskeletal   Gait and station: Has cerebral palsy so is in a wheelchair  Skin   Skin and subcutaneous tissue: Normal without rashes or lesions  Psychiatric   Mood and affect: Has changes from cerebral palsy but seems happy       Assessment / Plan:      The patient is a pleasant 79-year-old male of [place] descent with cerebral palsy who was referred to see us for a low white count with neutropenia and mild thrombocytopenia with a platelet count running above 100 for the last 2 years at least  He has been asymptomatic from this  He was not on any medication  His T cell gene rearrangement studies were negative for LGL  His blood work is quite stable  The white count has been fluctuating but the platelet count on this blood work is actually more improved  In the past Immunoglobulin levels were normal  I again discussed the possibility of a bone marrow biopsy with his family but they refused  I think this is reasonable considering his ECOG performance status of her 3  He does have cerebral palsy  His flow cytometry was also negative  I will see him back in a year  I did explain if they wish to follow-up with his family doctor they can do that also   His sister states she will discuss this with her father  For now we will schedule him for a year  Goals and Barriers:  Current Goal:  Prolong Survival from Low blood counts   Barriers: None  Patient's Capacity to Self Care:  Patient  able to self care  Portions of the record may have been created with voice recognition software   Occasional wrong word or "sound a like" substitutions may have occurred due to the inherent limitations of voice recognition software   Read the chart carefully and recognize, using context, where substitutions have occurred

## 2018-10-16 ENCOUNTER — TELEPHONE (OUTPATIENT)
Dept: INTERNAL MEDICINE CLINIC | Facility: CLINIC | Age: 38
End: 2018-10-16

## 2018-10-16 NOTE — TELEPHONE ENCOUNTER
PATIENT CALLED TO FIND OUT THE STATUS OF THE DISABILITY FORM SENT FROM THE SOCIAL SECURITY OFFICE SOMETIME LAST MONTH  -- CAN YOU CHECK INTO THIS?     THANKS

## 2018-10-17 NOTE — TELEPHONE ENCOUNTER
LVM FOR PATIENT THE WE ONLY HAVE A MEDICAL CERTIFICATION FORM FROM [employer] RECEIVED ON 04/2018 BUT WE DIDN'T RECEIVED ANY FORM FROM SOCIAL SECURITY OFFICE  PLACE COPY OF THE MEDICAL CERTIFICATION ON ACCORDION ON THE FRONT

## 2018-10-23 NOTE — TELEPHONE ENCOUNTER
Patient called states that the social security office mailed the form two weeks ago --- advised we did not receive it  Father to go to the social security office to get the form and bring it to our office to be completed

## 2018-10-25 NOTE — TELEPHONE ENCOUNTER
PATIENT DROPPED OFF SOCIAL SECURITY- PHYSICIAN'S/MEDICAL OFFICER'S STATEMENT OF PATIENT'S CAPABILITY TO MANAGE BENEFITS  FORM NEEDS TO BE MAILED OUT TO [employer] AND A COPY FOR THE PATIENT, HE PROVIDED A STAMPED ENVELOPE  ALSO PLEASE CALL INFORM TO INFORM HIM BOTH COPY HAVE BEEN SENT OUT  FORM PLACED IN SILAS'S BIN

## 2018-10-31 NOTE — TELEPHONE ENCOUNTER
I spoke with Juan Carlos Stanton, patients father and made him aware that I mailed out both copies one to social security and one to him

## 2018-10-31 NOTE — TELEPHONE ENCOUNTER
I spoke with Amy Samaniego, patients father and made him aware that I mailed out both copies one to social security and one to him

## 2018-12-05 ENCOUNTER — TELEPHONE (OUTPATIENT)
Dept: INTERNAL MEDICINE CLINIC | Facility: CLINIC | Age: 38
End: 2018-12-05

## 2018-12-05 NOTE — TELEPHONE ENCOUNTER
Barry Davison called because the straps on his left leg brace broke  He needs a new script for the strap  Please fax to [facility] [address]   Fax# [phone]

## 2018-12-06 NOTE — TELEPHONE ENCOUNTER
Called [facility] made them aware that we are having trouble finding an order for this they stated that patient would have to call them to schedule an appointment because this is a custom fit and they would need to see the patient

## 2018-12-17 DIAGNOSIS — G80.9 CEREBRAL PALSY, UNSPECIFIED TYPE (HCC): Primary | ICD-10-CM

## 2019-02-18 ENCOUNTER — TELEPHONE (OUTPATIENT)
Dept: MULTI SPECIALTY CLINIC | Facility: CLINIC | Age: 39
End: 2019-02-18

## 2019-02-18 NOTE — TELEPHONE ENCOUNTER
Please review (INDEPENDENT ENROLLMENT ) form placed in the [facility]) team folder in the provider flow station  ([phone]) when form is complete  If the form requires a signature by a MD or DO, please review it with them and sign the form  Please place the form in the [facility]) team folder in the Clinical flow station at the [address] [place] [address] and reply to this task to the Clinical Pool

## 2019-04-03 ENCOUNTER — TELEPHONE (OUTPATIENT)
Dept: INTERNAL MEDICINE CLINIC | Facility: CLINIC | Age: 39
End: 2019-04-03

## 2019-04-04 DIAGNOSIS — M25.561 CHRONIC PAIN OF RIGHT KNEE: ICD-10-CM

## 2019-04-04 DIAGNOSIS — M21.372 FOOT DROP, LEFT: ICD-10-CM

## 2019-04-04 DIAGNOSIS — G89.29 CHRONIC PAIN OF RIGHT KNEE: ICD-10-CM

## 2019-04-04 DIAGNOSIS — M41.80 DEXTROSCOLIOSIS: ICD-10-CM

## 2019-04-04 DIAGNOSIS — G80.1 SPASTIC DIPLEGIC CEREBRAL PALSY (HCC): Primary | ICD-10-CM

## 2019-04-19 ENCOUNTER — TELEPHONE (OUTPATIENT)
Dept: MULTI SPECIALTY CLINIC | Facility: CLINIC | Age: 39
End: 2019-04-19

## 2019-05-16 RX ORDER — IBUPROFEN 600 MG/1
TABLET ORAL
COMMUNITY

## 2019-05-17 ENCOUNTER — OFFICE VISIT (OUTPATIENT)
Dept: INTERNAL MEDICINE CLINIC | Facility: CLINIC | Age: 39
End: 2019-05-17

## 2019-05-17 VITALS — TEMPERATURE: 99 F | HEART RATE: 72 BPM | DIASTOLIC BLOOD PRESSURE: 80 MMHG | SYSTOLIC BLOOD PRESSURE: 100 MMHG

## 2019-05-17 DIAGNOSIS — R62.50 DEVELOPMENTAL DELAY: ICD-10-CM

## 2019-05-17 DIAGNOSIS — M21.372 FOOT DROP, LEFT: ICD-10-CM

## 2019-05-17 DIAGNOSIS — F81.89 NON-VERBAL LEARNING DISORDER: ICD-10-CM

## 2019-05-17 DIAGNOSIS — G80.0 SPASTIC QUADRIPLEGIC CEREBRAL PALSY (HCC): Primary | ICD-10-CM

## 2019-05-17 DIAGNOSIS — M41.80 DEXTROSCOLIOSIS: ICD-10-CM

## 2019-05-17 DIAGNOSIS — F80.9 COMMUNICATION DISABILITY: ICD-10-CM

## 2019-05-17 PROCEDURE — 1036F TOBACCO NON-USER: CPT | Performed by: INTERNAL MEDICINE

## 2019-05-17 PROCEDURE — 99213 OFFICE O/P EST LOW 20 MIN: CPT | Performed by: INTERNAL MEDICINE

## 2019-06-24 ENCOUNTER — TELEPHONE (OUTPATIENT)
Dept: INTERNAL MEDICINE CLINIC | Facility: CLINIC | Age: 39
End: 2019-06-24

## 2019-06-25 DIAGNOSIS — G80.0 SPASTIC QUADRIPLEGIC CEREBRAL PALSY (HCC): Primary | ICD-10-CM

## 2019-07-26 ENCOUNTER — TELEPHONE (OUTPATIENT)
Dept: INTERNAL MEDICINE CLINIC | Facility: CLINIC | Age: 39
End: 2019-07-26

## 2019-07-30 NOTE — TELEPHONE ENCOUNTER
Done, Form faxed to [employer] at [phone]  Form with fax conf scanned in the patients chart

## 2019-08-07 ENCOUNTER — TELEPHONE (OUTPATIENT)
Dept: INTERNAL MEDICINE CLINIC | Facility: CLINIC | Age: 39
End: 2019-08-07

## 2019-08-07 NOTE — TELEPHONE ENCOUNTER
Called patient to remind him that he have an upcoming appt with [facility] on 8/12/2019 at 1:20 pm and per [facility] last note in 5/17/2019 patient is suppose to follow up in six month and the current appt is to soon  I made patient aware and if there's anything that he would like to talk with [facility] or any reason to be seen and he said no was just routine follow up  Patient is ok to change his appt to the six month follow up  Appt scheduled for 11/18/2019 at 11:20 am  Patient verbally understood

## 2019-08-09 ENCOUNTER — TELEPHONE (OUTPATIENT)
Dept: INTERNAL MEDICINE CLINIC | Facility: CLINIC | Age: 39
End: 2019-08-09

## 2019-08-29 ENCOUNTER — TELEPHONE (OUTPATIENT)
Dept: INTERNAL MEDICINE CLINIC | Facility: CLINIC | Age: 39
End: 2019-08-29

## 2019-09-26 ENCOUNTER — TELEPHONE (OUTPATIENT)
Dept: HEMATOLOGY ONCOLOGY | Facility: CLINIC | Age: 39
End: 2019-09-26

## 2019-09-26 NOTE — TELEPHONE ENCOUNTER
Called and [facility] for pt to get labs drawn prior to his appt with Dr Suarez Citizen or we will have to reschedule

## 2019-09-27 DIAGNOSIS — D69.6 THROMBOCYTOPENIA (HCC): Primary | ICD-10-CM

## 2019-10-02 ENCOUNTER — TELEPHONE (OUTPATIENT)
Dept: HEMATOLOGY ONCOLOGY | Facility: CLINIC | Age: 39
End: 2019-10-02

## 2019-10-02 NOTE — TELEPHONE ENCOUNTER
Patient sister Priscila called to confirm blood work orders for patient's 10/4 appt with Dr Walter Rose  Confirmed a CBC and differential and CMP is ordered

## 2019-10-03 ENCOUNTER — APPOINTMENT (OUTPATIENT)
Dept: LAB | Facility: CLINIC | Age: 39
End: 2019-10-03
Payer: COMMERCIAL

## 2019-10-03 DIAGNOSIS — D69.6 THROMBOCYTOPENIA (HCC): ICD-10-CM

## 2019-10-03 LAB
ALBUMIN SERPL BCP-MCNC: 3.7 G/DL (ref 3.5–5)
ALP SERPL-CCNC: 54 U/L (ref 46–116)
ALT SERPL W P-5'-P-CCNC: 16 U/L (ref 12–78)
ANION GAP SERPL CALCULATED.3IONS-SCNC: 6 MMOL/L (ref 4–13)
AST SERPL W P-5'-P-CCNC: 17 U/L (ref 5–45)
BASOPHILS # BLD AUTO: 0.04 THOUSANDS/ΜL (ref 0–0.1)
BASOPHILS NFR BLD AUTO: 2 % (ref 0–1)
BILIRUB SERPL-MCNC: 1.2 MG/DL (ref 0.2–1)
BUN SERPL-MCNC: 9 MG/DL (ref 5–25)
CALCIUM SERPL-MCNC: 8.5 MG/DL (ref 8.3–10.1)
CHLORIDE SERPL-SCNC: 107 MMOL/L (ref 100–108)
CO2 SERPL-SCNC: 27 MMOL/L (ref 21–32)
CREAT SERPL-MCNC: 1 MG/DL (ref 0.6–1.3)
EOSINOPHIL # BLD AUTO: 0.05 THOUSAND/ΜL (ref 0–0.61)
EOSINOPHIL NFR BLD AUTO: 2 % (ref 0–6)
ERYTHROCYTE [DISTWIDTH] IN BLOOD BY AUTOMATED COUNT: 11.4 % (ref 11.6–15.1)
GFR SERPL CREATININE-BSD FRML MDRD: 110 ML/MIN/1.73SQ M
GLUCOSE P FAST SERPL-MCNC: 81 MG/DL (ref 65–99)
HCT VFR BLD AUTO: 42.5 % (ref 36.5–49.3)
HGB BLD-MCNC: 14.3 G/DL (ref 12–17)
IMM GRANULOCYTES # BLD AUTO: 0 THOUSAND/UL (ref 0–0.2)
IMM GRANULOCYTES NFR BLD AUTO: 0 % (ref 0–2)
LYMPHOCYTES # BLD AUTO: 1.31 THOUSANDS/ΜL (ref 0.6–4.47)
LYMPHOCYTES NFR BLD AUTO: 63 % (ref 14–44)
MCH RBC QN AUTO: 32.6 PG (ref 26.8–34.3)
MCHC RBC AUTO-ENTMCNC: 33.6 G/DL (ref 31.4–37.4)
MCV RBC AUTO: 97 FL (ref 82–98)
MONOCYTES # BLD AUTO: 0.2 THOUSAND/ΜL (ref 0.17–1.22)
MONOCYTES NFR BLD AUTO: 10 % (ref 4–12)
NEUTROPHILS # BLD AUTO: 0.48 THOUSANDS/ΜL (ref 1.85–7.62)
NEUTS SEG NFR BLD AUTO: 23 % (ref 43–75)
NRBC BLD AUTO-RTO: 0 /100 WBCS
PLATELET # BLD AUTO: 126 THOUSANDS/UL (ref 149–390)
PMV BLD AUTO: 9.8 FL (ref 8.9–12.7)
POTASSIUM SERPL-SCNC: 3.8 MMOL/L (ref 3.5–5.3)
PROT SERPL-MCNC: 6.6 G/DL (ref 6.4–8.2)
RBC # BLD AUTO: 4.38 MILLION/UL (ref 3.88–5.62)
SODIUM SERPL-SCNC: 140 MMOL/L (ref 136–145)
WBC # BLD AUTO: 2.08 THOUSAND/UL (ref 4.31–10.16)

## 2019-10-03 PROCEDURE — 85025 COMPLETE CBC W/AUTO DIFF WBC: CPT

## 2019-10-03 PROCEDURE — 36415 COLL VENOUS BLD VENIPUNCTURE: CPT

## 2019-10-03 PROCEDURE — 80053 COMPREHEN METABOLIC PANEL: CPT

## 2019-10-04 ENCOUNTER — OFFICE VISIT (OUTPATIENT)
Dept: HEMATOLOGY ONCOLOGY | Facility: CLINIC | Age: 39
End: 2019-10-04
Payer: COMMERCIAL

## 2019-10-04 VITALS
OXYGEN SATURATION: 99 % | HEART RATE: 55 BPM | SYSTOLIC BLOOD PRESSURE: 120 MMHG | DIASTOLIC BLOOD PRESSURE: 78 MMHG | TEMPERATURE: 96.5 F | RESPIRATION RATE: 16 BRPM

## 2019-10-04 DIAGNOSIS — D69.6 THROMBOCYTOPENIA (HCC): ICD-10-CM

## 2019-10-04 DIAGNOSIS — D70.8 OTHER NEUTROPENIA (HCC): Primary | ICD-10-CM

## 2019-10-04 PROCEDURE — 99214 OFFICE O/P EST MOD 30 MIN: CPT | Performed by: INTERNAL MEDICINE

## 2019-10-04 NOTE — PROGRESS NOTES
Hematology/Oncology Outpatient Follow- up Note  Parker Mayo 45 y o  male MRN: @ Encounter: [id number]        Date:  10/4/2019    Presenting Complaint/Diagnosis : Neutropenia and thrombocytopenia since 2014     Previous Hematologic/ Oncologic History:    Workup       Current Hematologic/ Oncologic Treatment:    Workup    Interval History:    The patient returns for follow-up visit  He was referred to see me for low blood counts  I have explained this may be secondary to his  ethnicity versus a low-grade process in his bone marrow  They did not wish to have a bone marrow biopsy done so we ordered blood work to work up the low white count  His T cell gene rearrangement studies were negative  Flow cytometry when done most recently was negative  He has been asymptomatic since he last saw me  He has had no infections and has needed no antibiotics  His 14 point review of systems today was negative  His platelet count is stable while the white count is low  It was actually higher in the end of 2018 but then dropped again  He has been asymptomatic and neutropenic for approximately 3-4 years now  Test Results:    Imaging: No results found  Labs:   Lab Results   Component Value Date    WBC 2 08 (L) 10/03/2019    HGB 14 3 10/03/2019    HCT 42 5 10/03/2019    MCV 97 10/03/2019     (L) 10/03/2019     Lab Results   Component Value Date    K 3 8 10/03/2019     10/03/2019    CO2 27 10/03/2019    BUN 9 10/03/2019    CREATININE 1 00 10/03/2019    GLUF 81 10/03/2019    CALCIUM 8 5 10/03/2019    AST 17 10/03/2019    ALT 16 10/03/2019    ALKPHOS 54 10/03/2019    EGFR 110 10/03/2019         ROS: As stated in the history of present illness otherwise his 14 point review of systems today was negative        Active Problems:   Patient Active Problem List   Diagnosis    Cerebral palsy ([facility] [address] )    Communication disability    Developmental delay    Dextroscoliosis    Foot drop, left    Neutropenia ([facility] [address] )    Mental retardation    Non-verbal learning disorder    Thrombocytopenia (HCC)    Chronic pain of right knee       Past Medical History:   Past Medical History:   Diagnosis Date    Cerebral palsy ([facility] [address] [facility]     Leukopenia     Last Assessed:  10/9/15    Thrombocytopenia ([facility] [address] )     Last Assessed:  10/13/14       Surgical History: No past surgical history on file  Family History:  No family history on file  Cancer-related family history is not on file      Social History:   Social History     Socioeconomic History    Marital status: Single     Spouse name: Not on file    Number of children: Not on file    Years of education: Not on file    Highest education level: Not on file   Occupational History    Not on file   Social Needs    Financial resource strain: Not on file    Food insecurity:     Worry: Not on file     Inability: Not on file    Transportation needs:     Medical: Not on file     Non-medical: Not on file   Tobacco Use    Smoking status: Never Smoker    Smokeless tobacco: Never Used   Substance and Sexual Activity    Alcohol use: No    Drug use: No    Sexual activity: Never   Lifestyle    Physical activity:     Days per week: Not on file     Minutes per session: Not on file    Stress: Not on file   Relationships    Social connections:     Talks on phone: Not on file     Gets together: Not on file     Attends Nondenominational service: Not on file     Active member of club or organization: Not on file     Attends meetings of clubs or organizations: Not on file     Relationship status: Not on file    Intimate partner violence:     Fear of current or ex partner: Not on file     Emotionally abused: Not on file     Physically abused: Not on file     Forced sexual activity: Not on file   Other Topics Concern    Not on file   Social History Narrative    Not on file       Current Medications:   Current Outpatient Medications   Medication Sig Dispense Refill    ibuprofen (MOTRIN) 600 mg tablet as needed  Misc  Devices (WALKER) MISC by Does not apply route      ondansetron (ZOFRAN ODT) 4 mg disintegrating tablet Take 1 tablet (4 mg total) by mouth every 8 (eight) hours as needed for nausea or vomiting for up to 5 days 15 tablet 0     No current facility-administered medications for this visit  Allergies: Allergies   Allergen Reactions    Chloroquine      Annotation - [id number]: ITCHY       Physical Exam:    There is no height or weight on file to calculate BSA  Wt Readings from Last 3 Encounters:   04/08/18 53 1 kg (117 lb 1 oz)   02/14/18 54 3 kg (119 lb 11 4 oz)   09/22/17 49 9 kg (110 lb)        Temp Readings from Last 3 Encounters:   10/04/19 (!) 96 5 °F (35 8 °C) (Tympanic)   05/17/19 99 °F (37 2 °C) (Oral)   09/27/18 97 8 °F (36 6 °C) (Tympanic)        BP Readings from Last 3 Encounters:   10/04/19 120/78   05/17/19 100/80   09/27/18 110/80         Pulse Readings from Last 3 Encounters:   10/04/19 55   05/17/19 72   09/27/18 71         Physical Exam     Constitutional Well-nourished  Does have cerebral palsy  Eyes   Conjunctiva and lids: No swelling, erythema or discharge  Pupils and irises: Equal, round and reactive to light  Ears, Nose, Mouth, and Throat   External inspection of ears and nose: Normal     Nasal mucosa, septum, and turbinates: Normal without edema or erythema  Oropharynx: Normal with no erythema, edema, exudate or lesions  Pulmonary   Respiratory effort: No increased work of breathing or signs of respiratory distress  Auscultation of lungs: Clear to auscultation  Cardiovascular   Palpation of heart: Normal PMI, no thrills  Auscultation of heart: Normal rate and rhythm, normal S1 and S2, without murmurs  Examination of extremities for edema and/or varicosities: Normal     Carotid pulses: Normal     Abdomen   Abdomen: Non-tender, no masses  Liver and spleen: No hepatomegaly or splenomegaly      Lymphatic   Palpation of lymph nodes in neck: No lymphadenopathy  Skin   Skin and subcutaneous tissue: Normal without rashes or lesions  Assessment / Plan:      The patient is a pleasant 70-year-old male of [place] descent with cerebral palsy who was referred to see us for a low white count with neutropenia and mild thrombocytopenia with a platelet count running above 100 for the last 3 years at least  He has been asymptomatic from this  He was not on any medication  His T cell gene rearrangement studies were negative for LGL  His blood work is quite stable  The white count has been fluctuating   In the past Immunoglobulin levels were normal  I again discussed the possibility of a bone marrow biopsy with his family but they refused  I think this is reasonable considering his ECOG performance status of her 3  He does have cerebral palsy  His flow cytometry was also negative  I will see him back in a year  I did explain if they wish to follow-up with his family doctor they can do that also  His sister states she will discuss this with her father  For now we will schedule him for a year  Goals and Barriers:  Current Goal:  Prolong Survival from Low white count and low platelet count which is chronic    Barriers: None  Patient's Capacity to Self Care:  Patient able to self care  Portions of the record may have been created with voice recognition software   Occasional wrong word or "sound a like" substitutions may have occurred due to the inherent limitations of voice recognition software   Read the chart carefully and recognize, using context, where substitutions have occurred

## 2019-10-10 ENCOUNTER — TELEPHONE (OUTPATIENT)
Dept: INTERNAL MEDICINE CLINIC | Facility: CLINIC | Age: 39
End: 2019-10-10

## 2019-10-10 DIAGNOSIS — G80.1 SPASTIC DIPLEGIC CEREBRAL PALSY (HCC): Primary | ICD-10-CM

## 2019-10-10 DIAGNOSIS — M41.80 DEXTROSCOLIOSIS: ICD-10-CM

## 2019-10-10 DIAGNOSIS — G80.9 CEREBRAL PALSY, UNSPECIFIED TYPE (HCC): ICD-10-CM

## 2019-10-10 NOTE — TELEPHONE ENCOUNTER
Selene from [address] clinic called asking if a new prescription for a wheelchair can be faxed to [phone] and she left their phone number to confirm [phone]

## 2019-10-10 NOTE — TELEPHONE ENCOUNTER
Form completed by Dr Stefan Allen and reviewed by medical assistant/nurse  Form placed in PURPLE folder in Clerical flow station

## 2019-10-10 NOTE — TELEPHONE ENCOUNTER
Done, [facility] [place] [facility] [phone]  Form scanned in to media with fax conf

## 2019-10-30 ENCOUNTER — TELEPHONE (OUTPATIENT)
Dept: INTERNAL MEDICINE CLINIC | Facility: CLINIC | Age: 39
End: 2019-10-30

## 2019-10-30 DIAGNOSIS — G80.0 SPASTIC QUADRIPLEGIC CEREBRAL PALSY (HCC): Primary | ICD-10-CM

## 2019-10-30 DIAGNOSIS — M21.372 FOOT DROP, LEFT: ICD-10-CM

## 2019-10-30 NOTE — TELEPHONE ENCOUNTER
Patients father called in stating that the straps that hold patients leg braces broke and they need a new script ordered and sent over to [facility]  I am not sure how you would like to go about ordering this  I am not sure if it is possible to place an order for just the straps or would you have to order the leg braces again? Please inform me

## 2019-11-29 ENCOUNTER — OFFICE VISIT (OUTPATIENT)
Dept: INTERNAL MEDICINE CLINIC | Facility: CLINIC | Age: 39
End: 2019-11-29

## 2019-11-29 VITALS — SYSTOLIC BLOOD PRESSURE: 120 MMHG | TEMPERATURE: 97.3 F | DIASTOLIC BLOOD PRESSURE: 84 MMHG | HEART RATE: 50 BPM

## 2019-11-29 DIAGNOSIS — R53.83 TIRED: Chronic | ICD-10-CM

## 2019-11-29 DIAGNOSIS — G80.0 SPASTIC QUADRIPLEGIC CEREBRAL PALSY (HCC): Primary | ICD-10-CM

## 2019-11-29 DIAGNOSIS — E78.00 ELEVATED LDL CHOLESTEROL LEVEL: Chronic | ICD-10-CM

## 2019-11-29 DIAGNOSIS — F81.89 NON-VERBAL LEARNING DISORDER: ICD-10-CM

## 2019-11-29 DIAGNOSIS — F80.9 COMMUNICATION DISABILITY: ICD-10-CM

## 2019-11-29 PROCEDURE — 1036F TOBACCO NON-USER: CPT | Performed by: PHYSICIAN ASSISTANT

## 2019-11-29 PROCEDURE — 99213 OFFICE O/P EST LOW 20 MIN: CPT | Performed by: PHYSICIAN ASSISTANT

## 2019-11-29 NOTE — PATIENT INSTRUCTIONS
As reviewed I have provided you with a script for blood test that are due as dated in 6 months  We will contact the family with the results  I have also provided a separate script to get his thyroid functions evaluated you may get this lab completed at any time in the next few weeks and we will contact you with the results  Also reviewed that he continues to follow up with the Heme-Onc doctor and is scheduled for October of 2020    You report that the family is working on getting him enrolled in a day program which will certainly help with his energy levels and socialization      Family has declined flu vaccine for patient, aware risks of complication if contracts flu    Appt once a year or as needed

## 2019-11-29 NOTE — PROGRESS NOTES
Assessment/Plan:  As reviewed I have provided you with a script for blood test that are due as dated in 6 months  We will contact the family with the results  I have also provided a separate script to get his thyroid functions evaluated you may get this lab completed at any time in the next few weeks and we will contact you with the results  Also reviewed that he continues to follow up with the Heme-Onc doctor and is scheduled for October of 2020    You report that the family is working on getting him enrolled in a day program which will certainly help with his energy levels and socialization  Family has declined flu vaccine for patient, aware risks of complication if contracts flu    Appt once a year or as needed  No problem-specific Assessment & Plan notes found for this encounter  Diagnoses and all orders for this visit:    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[address] )    Non-verbal learning disorder    Communication disability    Elevated LDL cholesterol level  -     Comprehensive metabolic panel; Future  -     Lipid Panel with Direct LDL reflex; Future    Tired  -     TSH, 3rd generation with Free T4 reflex          Subjective:      Patient ID: Jaleel Pineda is a 45 y o  male  Patient brought in by his brother for routine follow-up  Patient does remain under evaluation by Heme-Onc for persistent neutropenia and thrombocytopenia  Last visit with the Heme-Onc was October 2019  The discussion was had that his labs may be secondary to his [place] heritage although did not completely rule out a mild underlying bone marrow disorder  With discussion with family they agreed that they did not wish to pursue a bone marrow biopsy  As noted flow cytometry was negative in the past   Levels have slightly gone up and down but overall stable and therefore determined they would continue to monitor his labs at this time  Patient is scheduled for follow-up with Heme-Onc in October of 2020        States family is also working on getting him into a day program      Sleeping and eating well  Brother however reports he feels like he is sleeping a bit more than usual   He is usually awake by 630 in the morning but often napping on the couch in goes to bed early  That being said as noted patient does not have any day programs or other activities throughout the day  No issues with BM or urination  Mother reports that the patient has had no signs of difficulty breathing no coughing  Family still has the assigned  as well as getting some occupational therapy to help reduce the spasticity  Son spoke with patient's father who reported he did not want his son to receive any immunizations  Has received flu in past and aware once in day program should receive    BMI not able to be completed as patient did not have any walker and is not able to get out of his wheelchair and stand  Confirms received his new motorized wheelchair  Unsure when last dental appointment was  Checked with father but he did not have the record in front of him but will provide information to office  As patient is not verbal and unable to communicate completely family providing history  The following portions of the patient's history were reviewed and updated as appropriate: allergies, current medications, past family history, past medical history, past social history, past surgical history and problem list     Review of Systems   Constitutional: Negative for activity change, appetite change, diaphoresis, fever and unexpected weight change  Respiratory: Negative for cough and wheezing  Cardiovascular: Negative for leg swelling  Gastrointestinal: Negative for blood in stool, constipation, diarrhea and vomiting  Genitourinary: Negative for difficulty urinating and frequency  Skin: Negative for rash  Neurological: Negative for seizures and syncope  Psychiatric/Behavioral: Negative for sleep disturbance  The patient is not hyperactive  Objective:      /84 (BP Location: Right arm, Patient Position: Sitting, Cuff Size: Standard)   Pulse (!) 50   Temp (!) 97 3 °F (36 3 °C) (Oral)          Physical Exam   Constitutional: He appears well-developed and well-nourished  HENT:   Right Ear: External ear normal    Left Ear: External ear normal    Mouth/Throat: Oropharynx is clear and moist    Eyes: Conjunctivae are normal    Neck: Normal range of motion  No thyromegaly present  Cardiovascular: Normal rate, regular rhythm and normal heart sounds  No murmur heard  Pulmonary/Chest: Effort normal and breath sounds normal  He has no wheezes  Abdominal: Soft  Bowel sounds are normal  There is no tenderness  Musculoskeletal: He exhibits deformity  He exhibits no edema  Significant progressive spasticity to all extremities  Also rotational scoliosis  At this point is confined to wheelchair but can use walker to assist with transferring  R>L fingers in closed flexion, can extend but need to apply more pressure to extend but does not pull away or respond in pain  Brother reports patient often declines to do the OT  Family also continues to decline muscle relaxant to help reduce spasticity  Skin: Skin is dry  No rash noted  Psychiatric:   Not able to assess   Nursing note and vitals reviewed

## 2019-12-04 NOTE — PROGRESS NOTES
[facility] OVERDUE RESULTS REMINDER FOR TSH LABS, PT  WAS JUST MADE AWARE AT APPT   ON 11/29/19 TO HAVE DONE

## 2020-10-05 ENCOUNTER — TELEPHONE (OUTPATIENT)
Dept: INTERNAL MEDICINE CLINIC | Facility: CLINIC | Age: 40
End: 2020-10-05

## 2020-10-05 DIAGNOSIS — M21.372 FOOT DROP, LEFT: Primary | ICD-10-CM

## 2020-10-05 DIAGNOSIS — G80.0 SPASTIC QUADRIPLEGIC CEREBRAL PALSY (HCC): ICD-10-CM

## 2020-10-13 ENCOUNTER — TELEPHONE (OUTPATIENT)
Dept: INTERNAL MEDICINE CLINIC | Facility: CLINIC | Age: 40
End: 2020-10-13

## 2020-11-02 ENCOUNTER — TELEPHONE (OUTPATIENT)
Dept: HEMATOLOGY ONCOLOGY | Facility: HOSPITAL | Age: 40
End: 2020-11-02

## 2020-11-02 ENCOUNTER — TELEPHONE (OUTPATIENT)
Dept: HEMATOLOGY ONCOLOGY | Facility: CLINIC | Age: 40
End: 2020-11-02

## 2020-11-02 DIAGNOSIS — D70.8 OTHER NEUTROPENIA (HCC): Primary | ICD-10-CM

## 2020-12-02 ENCOUNTER — OFFICE VISIT (OUTPATIENT)
Dept: INTERNAL MEDICINE CLINIC | Facility: CLINIC | Age: 40
End: 2020-12-02

## 2020-12-02 ENCOUNTER — TELEPHONE (OUTPATIENT)
Dept: INTERNAL MEDICINE CLINIC | Facility: CLINIC | Age: 40
End: 2020-12-02

## 2020-12-02 VITALS
OXYGEN SATURATION: 100 % | HEART RATE: 69 BPM | TEMPERATURE: 97.5 F | DIASTOLIC BLOOD PRESSURE: 74 MMHG | SYSTOLIC BLOOD PRESSURE: 110 MMHG

## 2020-12-02 DIAGNOSIS — D69.6 THROMBOCYTOPENIA (HCC): ICD-10-CM

## 2020-12-02 DIAGNOSIS — M21.372 FOOT DROP, LEFT: ICD-10-CM

## 2020-12-02 DIAGNOSIS — G80.0 SPASTIC QUADRIPLEGIC CEREBRAL PALSY (HCC): Primary | ICD-10-CM

## 2020-12-02 DIAGNOSIS — Z00.00 ENCOUNTER FOR ANNUAL HEALTH EXAMINATION: ICD-10-CM

## 2020-12-02 DIAGNOSIS — R26.9 GAIT ABNORMALITY: ICD-10-CM

## 2020-12-02 DIAGNOSIS — F81.89 NON-VERBAL LEARNING DISORDER: ICD-10-CM

## 2020-12-02 DIAGNOSIS — M41.80 DEXTROSCOLIOSIS: ICD-10-CM

## 2020-12-02 PROBLEM — E78.00 ELEVATED LDL CHOLESTEROL LEVEL: Chronic | Status: RESOLVED | Noted: 2019-11-29 | Resolved: 2020-12-02

## 2020-12-02 PROCEDURE — 99395 PREV VISIT EST AGE 18-39: CPT | Performed by: PHYSICIAN ASSISTANT

## 2020-12-22 ENCOUNTER — APPOINTMENT (OUTPATIENT)
Dept: LAB | Facility: CLINIC | Age: 40
End: 2020-12-22
Payer: COMMERCIAL

## 2020-12-22 ENCOUNTER — TRANSCRIBE ORDERS (OUTPATIENT)
Dept: LAB | Facility: CLINIC | Age: 40
End: 2020-12-22

## 2020-12-22 DIAGNOSIS — D70.8 OTHER NEUTROPENIA (HCC): ICD-10-CM

## 2020-12-22 LAB
ALBUMIN SERPL BCP-MCNC: 3.8 G/DL (ref 3.5–5)
ALP SERPL-CCNC: 51 U/L (ref 46–116)
ALT SERPL W P-5'-P-CCNC: 20 U/L (ref 12–78)
ANION GAP SERPL CALCULATED.3IONS-SCNC: 6 MMOL/L (ref 4–13)
AST SERPL W P-5'-P-CCNC: 19 U/L (ref 5–45)
BASOPHILS # BLD AUTO: 0.02 THOUSANDS/ΜL (ref 0–0.1)
BASOPHILS NFR BLD AUTO: 1 % (ref 0–1)
BILIRUB SERPL-MCNC: 1.09 MG/DL (ref 0.2–1)
BUN SERPL-MCNC: 9 MG/DL (ref 5–25)
CALCIUM SERPL-MCNC: 8.8 MG/DL (ref 8.3–10.1)
CHLORIDE SERPL-SCNC: 107 MMOL/L (ref 100–108)
CO2 SERPL-SCNC: 27 MMOL/L (ref 21–32)
CREAT SERPL-MCNC: 1.16 MG/DL (ref 0.6–1.3)
EOSINOPHIL # BLD AUTO: 0.04 THOUSAND/ΜL (ref 0–0.61)
EOSINOPHIL NFR BLD AUTO: 2 % (ref 0–6)
ERYTHROCYTE [DISTWIDTH] IN BLOOD BY AUTOMATED COUNT: 11.5 % (ref 11.6–15.1)
GFR SERPL CREATININE-BSD FRML MDRD: 91 ML/MIN/1.73SQ M
GLUCOSE P FAST SERPL-MCNC: 87 MG/DL (ref 65–99)
HCT VFR BLD AUTO: 41.1 % (ref 36.5–49.3)
HGB BLD-MCNC: 14.1 G/DL (ref 12–17)
IMM GRANULOCYTES # BLD AUTO: 0.01 THOUSAND/UL (ref 0–0.2)
IMM GRANULOCYTES NFR BLD AUTO: 0 % (ref 0–2)
LDH SERPL-CCNC: 160 U/L (ref 81–234)
LYMPHOCYTES # BLD AUTO: 1.44 THOUSANDS/ΜL (ref 0.6–4.47)
LYMPHOCYTES NFR BLD AUTO: 61 % (ref 14–44)
MCH RBC QN AUTO: 33.3 PG (ref 26.8–34.3)
MCHC RBC AUTO-ENTMCNC: 34.3 G/DL (ref 31.4–37.4)
MCV RBC AUTO: 97 FL (ref 82–98)
MONOCYTES # BLD AUTO: 0.23 THOUSAND/ΜL (ref 0.17–1.22)
MONOCYTES NFR BLD AUTO: 10 % (ref 4–12)
NEUTROPHILS # BLD AUTO: 0.62 THOUSANDS/ΜL (ref 1.85–7.62)
NEUTS SEG NFR BLD AUTO: 26 % (ref 43–75)
NRBC BLD AUTO-RTO: 0 /100 WBCS
PLATELET # BLD AUTO: 121 THOUSANDS/UL (ref 149–390)
PMV BLD AUTO: 10 FL (ref 8.9–12.7)
POTASSIUM SERPL-SCNC: 3.9 MMOL/L (ref 3.5–5.3)
PROT SERPL-MCNC: 6.4 G/DL (ref 6.4–8.2)
RBC # BLD AUTO: 4.23 MILLION/UL (ref 3.88–5.62)
SODIUM SERPL-SCNC: 140 MMOL/L (ref 136–145)
WBC # BLD AUTO: 2.36 THOUSAND/UL (ref 4.31–10.16)

## 2020-12-22 PROCEDURE — 83615 LACTATE (LD) (LDH) ENZYME: CPT

## 2020-12-22 PROCEDURE — 85025 COMPLETE CBC W/AUTO DIFF WBC: CPT

## 2020-12-22 PROCEDURE — 80053 COMPREHEN METABOLIC PANEL: CPT

## 2020-12-22 PROCEDURE — 36415 COLL VENOUS BLD VENIPUNCTURE: CPT

## 2021-03-31 DIAGNOSIS — Z23 ENCOUNTER FOR IMMUNIZATION: ICD-10-CM

## 2021-04-17 ENCOUNTER — IMMUNIZATIONS (OUTPATIENT)
Dept: FAMILY MEDICINE CLINIC | Facility: HOSPITAL | Age: 41
End: 2021-04-17

## 2021-04-17 DIAGNOSIS — Z23 ENCOUNTER FOR IMMUNIZATION: Primary | ICD-10-CM

## 2021-04-17 PROCEDURE — 91300 SARS-COV-2 / COVID-19 MRNA VACCINE (PFIZER-BIONTECH) 30 MCG: CPT

## 2021-04-17 PROCEDURE — 0001A SARS-COV-2 / COVID-19 MRNA VACCINE (PFIZER-BIONTECH) 30 MCG: CPT

## 2021-05-08 ENCOUNTER — IMMUNIZATIONS (OUTPATIENT)
Dept: FAMILY MEDICINE CLINIC | Facility: HOSPITAL | Age: 41
End: 2021-05-08

## 2021-05-08 DIAGNOSIS — Z23 ENCOUNTER FOR IMMUNIZATION: Primary | ICD-10-CM

## 2021-05-08 PROCEDURE — 0002A SARS-COV-2 / COVID-19 MRNA VACCINE (PFIZER-BIONTECH) 30 MCG: CPT

## 2021-05-08 PROCEDURE — 91300 SARS-COV-2 / COVID-19 MRNA VACCINE (PFIZER-BIONTECH) 30 MCG: CPT

## 2021-06-25 ENCOUNTER — OFFICE VISIT (OUTPATIENT)
Dept: INTERNAL MEDICINE CLINIC | Facility: CLINIC | Age: 41
End: 2021-06-25

## 2021-06-25 VITALS — TEMPERATURE: 97.8 F | OXYGEN SATURATION: 97 % | HEART RATE: 80 BPM

## 2021-06-25 DIAGNOSIS — G80.0 SPASTIC QUADRIPLEGIC CEREBRAL PALSY (HCC): Primary | ICD-10-CM

## 2021-06-25 DIAGNOSIS — F81.89 NON-VERBAL LEARNING DISORDER: ICD-10-CM

## 2021-06-25 DIAGNOSIS — M21.372 FOOT DROP, LEFT: ICD-10-CM

## 2021-06-25 DIAGNOSIS — M41.80 DEXTROSCOLIOSIS: ICD-10-CM

## 2021-06-25 DIAGNOSIS — R26.9 GAIT ABNORMALITY: ICD-10-CM

## 2021-06-25 PROCEDURE — 99213 OFFICE O/P EST LOW 20 MIN: CPT | Performed by: PHYSICIAN ASSISTANT

## 2021-06-25 NOTE — PROGRESS NOTES
Assessment/Plan: On your visit today we updated your information  New form for disability parking placard was completed and given to you during the visit  We did discuss that a prescription for a new walker will be written and you will be contacted when it is available to be picked up  We did discuss however that it is quite possible that his current insurance will not cover a walker that is surrounded with a harness but that you are working with another program that may be able to assist the insurance to receive this  We did review however that he is quite limited in his ability to walk and stand and will see if the new walker will help with this  We also  Discussed that you are working with a dental Foundation to help get the missing tooth in the front replaced  No problem-specific Assessment & Plan notes found for this encounter  Diagnoses and all orders for this visit:    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[facility] [address] )  -     Walker    Gait abnormality  -     Walker    Foot drop, left  -     Walker    Dextroscoliosis  -     Walker    Non-verbal learning disorder          Subjective:      Patient ID: Virginia Cordova is a 36 y o  male  Patient brought in by family as need a new script to get a walker, father wants the wrap around and [facility] needs new script for walker  Father and sister also accompanied patient to this visit today  Did have a discussion with family as we did provide a script last time and wanted to know if it was denied or if we needed different documentation to get approved but the family states they are not sure with they did with that script and therefore it was not completed  Also requesting a new disability parking placard  Also asked if there was something different a needed as this was completed for permanent a few months ago but they state they do not know where the form is and therefore new 1 was completed for them today        Family also reports that they are working with [employer] to try to get his front tooth  Replaced  This has been missing for an extended period of time  Family reports that not only does he find it difficult as he is embarrassed to be out in public and therapy without that tooth but it also causes him to avoid eating certain foods because he cannot bite into an apple or other foods  Received his COVID vaccine series  Family reports that he is eating and sleeping well and offer no additional concerns today  The following portions of the patient's history were reviewed and updated as appropriate: allergies, current medications, past family history, past medical history, past social history, past surgical history and problem list     Review of Systems   Constitutional: Negative  Negative for chills and fever  Respiratory: Negative  Negative for cough  Gastrointestinal: Negative for constipation, diarrhea, nausea and vomiting  Musculoskeletal: Positive for arthralgias, gait problem and myalgias  Negative for joint swelling  Skin: Negative  Neurological: Positive for speech difficulty and weakness  Negative for syncope  Objective:      Pulse 80   Temp 97 8 °F (36 6 °C) (Temporal)   SpO2 97%          Physical Exam  Vitals and nursing note reviewed  Constitutional:       Comments: Pleasant young man with CP in wheelchair   Cardiovascular:      Rate and Rhythm: Normal rate and regular rhythm  Heart sounds: Normal heart sounds  Pulmonary:      Effort: Pulmonary effort is normal       Breath sounds: Normal breath sounds  Abdominal:      General: Bowel sounds are normal    Musculoskeletal:         General: Deformity present  Comments: Rotational scoliosis, spasticity and contractures   Neurological:      Mental Status: He is alert     Psychiatric:         Mood and Affect: Mood normal       Comments: Not verbal but can nod to confirm questions asked

## 2021-06-28 PROBLEM — R53.83 TIRED: Chronic | Status: RESOLVED | Noted: 2019-11-29 | Resolved: 2021-06-28

## 2021-06-28 NOTE — PATIENT INSTRUCTIONS
On your visit today we updated your information  New form for disability parking placard was completed and given to you during the visit  We did discuss that a prescription for a new walker will be written and you will be contacted when it is available to be picked up  We did discuss however that it is quite possible that his current insurance will not cover a walker that is surrounded with a harness but that you are working with another program that may be able to assist the insurance to receive this  We did review however that he is quite limited in his ability to walk and stand and will see if the new walker will help with this  We also  Discussed that you are working with a dental Foundation to help get the missing tooth in the front replaced

## 2021-07-07 ENCOUNTER — TELEPHONE (OUTPATIENT)
Dept: INTERNAL MEDICINE CLINIC | Facility: CLINIC | Age: 41
End: 2021-07-07

## 2021-07-07 NOTE — TELEPHONE ENCOUNTER
Priscila sister spoke with [address] [place] [address] and they are requiring an order for Physical therapy eval for walker  Can we please write this script? Please send to email [email or web address]

## 2021-08-18 ENCOUNTER — TELEPHONE (OUTPATIENT)
Dept: INTERNAL MEDICINE CLINIC | Facility: CLINIC | Age: 41
End: 2021-08-18

## 2021-08-18 NOTE — TELEPHONE ENCOUNTER
RAS from [address] was sent to [facility]

## 2021-09-17 ENCOUNTER — TELEPHONE (OUTPATIENT)
Dept: INTERNAL MEDICINE CLINIC | Facility: CLINIC | Age: 41
End: 2021-09-17

## 2021-09-17 DIAGNOSIS — H61.23 BILATERAL IMPACTED CERUMEN: Primary | ICD-10-CM

## 2021-09-17 NOTE — TELEPHONE ENCOUNTER
Sister Elodia Stringer is calling in requesting a referral to ENT  Patients ear's are filled with cerumen so he need's them cleaned or flushed  I expressed to her that we have that specialty that rotates out of here but the wait is out till next year  I gave her the option of going to [facility] where they take his insurance  Once referral is placed if and when appropriate I will provide her with that number  Thank you much!

## 2021-09-20 PROBLEM — H61.23 BILATERAL IMPACTED CERUMEN: Status: ACTIVE | Noted: 2021-09-20

## 2021-09-21 ENCOUNTER — TELEPHONE (OUTPATIENT)
Dept: INTERNAL MEDICINE CLINIC | Facility: CLINIC | Age: 41
End: 2021-09-21

## 2021-09-21 NOTE — TELEPHONE ENCOUNTER
Silvia from [facility] called to request two new orders  One for the walker with wheels and harness for balance and assistance (must say DME on it) and one for the physical therapy eval for gait training  [facility] can't get patient in for an eval until October so she needs updated scripts  Please fax order to Fax# [phone]  Previous orders in chart will not work because of the dates

## 2021-09-21 NOTE — TELEPHONE ENCOUNTER
Please see below and place orders and referral  Once completed will fax to number listed below   Thank you

## 2021-09-22 NOTE — TELEPHONE ENCOUNTER
Scanned script into chart after faxing to [facility]  However there is now a new encounter says to have script updated and sent  This encounter is not the updated script

## 2021-09-28 DIAGNOSIS — R26.9 GAIT ABNORMALITY: ICD-10-CM

## 2021-09-28 DIAGNOSIS — M41.80 DEXTROSCOLIOSIS: ICD-10-CM

## 2021-09-28 DIAGNOSIS — M21.372 FOOT DROP, LEFT: ICD-10-CM

## 2021-09-28 DIAGNOSIS — G80.0 SPASTIC QUADRIPLEGIC CEREBRAL PALSY (HCC): Primary | ICD-10-CM

## 2021-09-28 NOTE — TELEPHONE ENCOUNTER
[facility] called and stated they need the scripts to say DME on it which was missed  I transferred call to Ask The Doctor so this can be clarified to what needs to be done with the scripts  Will follow up once done

## 2021-10-26 ENCOUNTER — TELEPHONE (OUTPATIENT)
Dept: INTERNAL MEDICINE CLINIC | Facility: CLINIC | Age: 41
End: 2021-10-26

## 2021-12-14 ENCOUNTER — TELEPHONE (OUTPATIENT)
Dept: INTERNAL MEDICINE CLINIC | Facility: CLINIC | Age: 41
End: 2021-12-14

## 2021-12-14 NOTE — TELEPHONE ENCOUNTER
Folder Color- BLUE    Name of [address] DIVISION EXPERT REPORT    Form to be filled out by- SILAS    Form to be PICKED UP [address] ([phone])    Patient made aware of 10 business day policy  Patient's sister dropped off form  Per [place] their father passed away in July and their mother has a terminal illness

## 2021-12-15 NOTE — TELEPHONE ENCOUNTER
Per Orlando Palomo patient needs to be scheduled with a resident for evaluation in order to complete forms  Clerical- please reach out to the patient to schedule an appointment with a Resident  Thank you     Form placed back in the BLUE clinical folder

## 2021-12-28 NOTE — TELEPHONE ENCOUNTER
Patient is schedule for appt  on 1/17/22 with Dr Freddie Sinha for evaluation to have paperwork completed

## 2021-12-30 ENCOUNTER — TELEPHONE (OUTPATIENT)
Dept: INTERNAL MEDICINE CLINIC | Facility: CLINIC | Age: 41
End: 2021-12-30

## 2022-02-18 ENCOUNTER — PATIENT OUTREACH (OUTPATIENT)
Dept: INTERNAL MEDICINE CLINIC | Facility: CLINIC | Age: 42
End: 2022-02-18

## 2022-02-18 ENCOUNTER — OFFICE VISIT (OUTPATIENT)
Dept: INTERNAL MEDICINE CLINIC | Facility: CLINIC | Age: 42
End: 2022-02-18

## 2022-02-18 VITALS
SYSTOLIC BLOOD PRESSURE: 100 MMHG | WEIGHT: 114 LBS | HEART RATE: 54 BPM | BODY MASS INDEX: 20.85 KG/M2 | DIASTOLIC BLOOD PRESSURE: 64 MMHG

## 2022-02-18 DIAGNOSIS — G80.0 SPASTIC QUADRIPLEGIC CEREBRAL PALSY (HCC): Primary | ICD-10-CM

## 2022-02-18 PROCEDURE — 99213 OFFICE O/P EST LOW 20 MIN: CPT | Performed by: INTERNAL MEDICINE

## 2022-02-18 NOTE — PROGRESS NOTES
MONICA has met with this severely disabled 38 y/o male pt " S" and his very supportive Brother Evelio Guillen ( [address] )  [phone]   Pt suffers from Spastic quadriplegic cerebral palsy , is non verbal, wheelchair dependent and require 24 / care and assistance with all ADLs    His Brother Dev shares that their Father has  about 6 months ago and their Mother has been  DX with Cancer and is quite ill  There are 5 siblings  They all live in the [place] area  He and other siblings have been providing 24 hour care with limited out side help under a PA WAIVER program     Pt's Brother Dev was not sure which Waiver this is and who the Care Coordinator is   He contacted his other Brother Brannon [facility]) [phone] via the phone during our visit  He does share they has started to get limited help through [facility] [place])   Pt also has some PT services which are self pay? Pt is residing in his Parent's 2 story home  There are 2 steps to enter and pt's bedroom is on the 2nd floor  His Brother is able to help him walk up the stairs but other caregivers who pt does not recognize can not assist as well  The family is concerned as to how to best care for pt due to the changes in his supports with his Father's death and  his Mother's  illness  They are looking towards what future plans if they can not maintain the current house   The 5 siblings have separate residences and family members have had to move in the parent's/ pt's home to help  They are not sure if they can get an handicapped apt for pt, a Group Home or is a nursing home may one day be needed which they would like to avoid  The Brother relates a  has mentioned pt may need to switch the type of Waiver he is on   Jorge Dawkins has called Service Access management and spoke to [employer] [phone] and they are no longer involved since 2018   MONICA called [place] [facility] and referral and  Marcia Fair shared pt was evaluated by Aging in 2018 and found to be Nursing Home Clinically Eligible   He is thrn most likely on the Adult Waiver Program    Sw to f/u with pt/family next week with the contact info they will provided

## 2022-02-18 NOTE — PROGRESS NOTES
[facility]  INTERNAL MEDICINE OFFICE VISIT     PATIENT INFORMATION     Priscilla Ped   39 y o  male   MRN: [id number]    ASSESSMENT/PLAN     1  Capacity/Disability determination:  Patient is presenting with his brother to fill out form for capacity/disability evaluation  Patient has history of cerebral palsy and he is completely wheelchair bound  Patient does not interact with anyone  Non verbal   Requires 24/7 care by brother  Patient not able to do any activities of daily living by himself  He communicates via sign language (although not American sign language)  Will fill out the capacity form and call family next week   consulted to help family with any needs  Diagnoses and all orders for this visit:    Spastic quadriplegic cerebral palsy [facility])  -     Ambulatory Referral to Social Work Care Management Program; Future      Schedule a follow-up appointment in [facility]  HEALTH MAINTENANCE     Immunization History   Administered Date(s) Administered    COVID-19 PFIZER VACCINE 0 3 ML IM 04/17/2021, 05/08/2021    INFLUENZA 11/01/2015, 10/25/2017    Influenza Quadrivalent Preservative Free 3 years and older IM 10/06/2016    Influenza Split 09/01/2014    Influenza, seasonal, injectable 10/13/2014, 10/09/2015     CHIEF COMPLAINT     Chief Complaint   Patient presents with    form to be completed      HISTORY OF PRESENT ILLNESS      Patient has history of cerebral palsy and he is completely wheelchair bound  Patient does not interacThis is 70-year-old male with past medical history of cerebral palsy is here to fill out form for disability  t with anyone  Non verbal   Requires 24/7 care by brother  Patient not able to do any activities of daily living by himself  He communicates via sign language (although not American sign language)        REVIEW OF SYSTEMS     Review of Systems   Constitutional: Negative for activity change, appetite change, chills and diaphoresis  Respiratory: Negative for apnea, cough, chest tightness and shortness of breath  Cardiovascular: Negative for chest pain, palpitations and leg swelling  Gastrointestinal: Negative for abdominal distention, abdominal pain, constipation and diarrhea  OBJECTIVE     Vitals:    02/18/22 1122   BP: 100/64   BP Location: Right arm   Patient Position: Sitting   Cuff Size: Standard   Pulse: (!) 54   Weight: 51 7 kg (114 lb)     Physical Exam  Vitals reviewed  Constitutional:       General: He is not in acute distress  Appearance: He is well-developed  He is not diaphoretic  Cardiovascular:      Rate and Rhythm: Normal rate and regular rhythm  Heart sounds: Normal heart sounds  No murmur heard  No friction rub  Pulmonary:      Effort: Pulmonary effort is normal  No respiratory distress  Breath sounds: Normal breath sounds  No stridor  No wheezing  Abdominal:      General: Bowel sounds are normal  There is no distension  Palpations: Abdomen is soft  Tenderness: There is no abdominal tenderness  There is no guarding  CURRENT MEDICATIONS     Current Outpatient Medications:     ibuprofen (MOTRIN) 600 mg tablet, as needed, Disp: , Rfl:     Misc  Devices [facility]) MISC, by Does not apply route (Patient not taking: Reported on 6/25/2021), Disp: , Rfl:     PAST MEDICAL & SURGICAL HISTORY     Past Medical History:   Diagnosis Date    Cerebral palsy ([facility] [address] )     Leukopenia     Last Assessed:  10/9/15    Thrombocytopenia ([facility] [address] )     Last Assessed:  10/13/14    Tired 11/29/2019     History reviewed  No pertinent surgical history    SOCIAL & FAMILY HISTORY     Social History     Socioeconomic History    Marital status: Single     Spouse name: Not on file    Number of children: Not on file    Years of education: Not on file    Highest education level: Not on file   Occupational History    Not on file   Tobacco Use    Smoking status: Never Smoker    Smokeless tobacco: Never Used   Vaping Use    Vaping Use: Never used   Substance and Sexual Activity    Alcohol use: No    Drug use: No    Sexual activity: Never   Other Topics Concern    Not on file   Social History Narrative    Not on file     Social Determinants of Health     Financial Resource Strain: Not on file   Food Insecurity: Not on file   Transportation Needs: Not on file   Physical Activity: Not on file   Stress: Not on file   Social Connections: Not on file   Intimate Partner Violence: Not on file   Housing Stability: Not on file     Social History     Substance and Sexual Activity   Alcohol Use No     Social History     Substance and Sexual Activity   Drug Use No     Social History     Tobacco Use   Smoking Status Never Smoker   Smokeless Tobacco Never Used     History reviewed  No pertinent family history   ==  Merleen Jeans, DO  PGY-3  [facility] Internal Medicine [address] , [address] [address] [facility], [address] [facility]  Office: [phone]  Fax: [phone]

## 2022-02-21 NOTE — TELEPHONE ENCOUNTER
Forms scanned into patient's chart  [facility]ts [facility] was notified that paperwork is ready for  and will be upfront in accordion folder

## 2022-03-09 ENCOUNTER — PATIENT OUTREACH (OUTPATIENT)
Dept: INTERNAL MEDICINE CLINIC | Facility: CLINIC | Age: 42
End: 2022-03-09

## 2022-03-09 NOTE — PROGRESS NOTES
MONICA was reviewing chart and pt family had never returned call to [facility] with care coordinators information  Per chart review, pt is severely disabled and receives limited help in the home through \[facility]\"" [facility]  Pts father had recently passed away and his mother with diagnosis of Cancer  Pt has five siblings which have been trying to assist with coordination of care  In 2018 pt was approved by [facility] for nursing home level of care  When speaking last, pts family was unsure who the care coordinator was in pts case  They were going to call in with the information  I attempted to reach pts family and left a message to please return [facility] call  MONICA then contacted [facility] in [place] @ [phone] to inquire if they know who pts care coordinator is  I spoke to [place] who states that they have someone, Kel Vitale working on pts case to obtainmore hours of care for pt  Kel Vitale is currently in a meeting and will return [facility] call  [facility] also believes Kel Vitale will know who the care coordinator is for pt  [facility] will await return call and will remain available  Update: [facility] received a call back from Kel Vitale at [facility] in [place] @ [phone]  Kel Vitale states that I was provided with incorrect information this morning  Kel Vitale is not working on  More hours for pt  Kel Vitale states that their agency is unable to staff the large amount of hours pt is approved for   She states pt is approved for 164 hours per week ( which is only 4 hours per week not staffed ) pt has 9 am - 3 pm , 3pm - 11pm, and 11pm - Lady Nereida states that she was able to staff pt before because his family members were filling in and being paid as family caregiver support, but she reports little by little they are no longer wanting to fill in  Kel Vitale feels it is unsafe for pt how many hours they are unable to fill  Kel Vitale was going to call pts  today to inform her of same  CHARLIE was appreciative of the information  Deacon Wilhelm shared that many of her aides do no like to go to pts home because of how cluttered it is  She said it has been difficult to find anyone that is willing to the home  [facility] then asked for pts  contact information  Cheo Angulo @ [phone]  I attempted to reach pts  and left a message to call myself or on site [address]  MONICA will remain available  Update : [facility] received a message from on site [facility] to please return call to Cheo Angulo @ [phone]  [facility] called Toñito Meléndez and she was extremely helpful in clarifying the current situation  Pt is approved for 24 care through the Adult Waiver program   Pt and family have worked with Toñito Meléndez for years  The family used to split care for pt between pts father, pts mother , pts sister Afshin Gordon and then pts brother would fill in as needed  When pts father passed away in November, pts sister Afshin Gordon stopped working her hours due to a new boyfriend  The family was advised that two people cannot split all the hours because then it is considered overtime and due to labor laws the family is unable to work that many hours with the same company  The family was advised they could find outside help and split the hours among four people again, or split the hours between companies  For example, if they use [employer] for 82 hours per week and [employer] for the other 82, then the two family members can continue working because it is considered having two jobs and not overtime  The family was advised of this and encouraged to sign up for a secondary agency, but they have not  [facility] Officer has given them notice they cannot continue to be paid overtime, but the family has not yet spoken to a second agency  So, at this time, the family will only be paid what labor laws per week per person and not be paid for the overtime hours     Toñito Meléndez also states that the family has talked about the idea of pt residing with other people from the [facility]  Pt will be eligible for the 24 hour care regardless of where he is living  In regard to group home options, pt would need to change the type of waiver program he has  Pt would need the IDD waiver for a group home  Pt currently has the adult waiver which would only allow for a SNF  Dianna Cope has also spoke to family in regard to same, but the family would need to withdraw from the current waiver to apply for the IDD one and it is unknown how long pt will be without services  This makes the family nervous and that is why they have not proceeded with the change  At this time, the family will need to divide the caregiver hours among more people or add an agency to work two jobs due to labor laws  If the family is interested in a group home setting, pts Waiver would need to be changed  Dianna Cope is going to reach out to the family to explain again what options they have,  CHARLIE is also waiting for a call back from the family and is now aware of the details of the case  [facility] will remain available as needed  Dianna Cope has expressed she will assist family with whatever choice they make

## 2022-03-16 ENCOUNTER — PATIENT OUTREACH (OUTPATIENT)
Dept: INTERNAL MEDICINE CLINIC | Facility: CLINIC | Age: 42
End: 2022-03-16

## 2022-03-16 NOTE — PROGRESS NOTES
MONICA spoke with pt's brother Zuleyka Walls [phone] who relates pt is doing alright at this time  He shares they are working to get another agency involved to help cover the PA WAIVER Approved hours and keep pt at home  He relates his Mother is not doing the  well as she has good and bad days  His Brother Umberto Cruz  [phone] is actually managing his brother's care  He will reach out to [facility] and have him return SW call  MONICA also called pt's  Cheo Angulo [phone] and she reviewed she has met several times with pt's family and has another meeting planned  She has confirmed the family is wanting to keep pt at home and is covering most of his approved PA Waiver Hours  She has explained labor laws involving how many hours they can work and they do have an additional  agency involved now   (they have had [address] & have added [employer]) The one brother Connie Jacobson will be working for both agencies  They  do have a female aide covering 2 shifts  Shewe will f/u with family re same  Ms Emma Gonzalez the  also has encouraged family to seek an Advocate /POA for this pt  She provided them the resources of [employer] [place] [phone], [facility] [place] [phone] as well as [address] [place] [address] [phone]  She believes they are working with one program   Joshua Keyes has also expressed to Ms Emma Gonzalez that his Brother Zuleyka Walls has asked if pt can resume previous service such as PT and any Day Programs  SW advised him to speak with the Coordinator re same  She shares that she has offered the [facility] Adult Day Program a  possible option  Again she will f/u with pt /family re the programs  SW awaits Family's return call

## 2022-03-17 ENCOUNTER — TELEPHONE (OUTPATIENT)
Dept: INTERNAL MEDICINE CLINIC | Facility: CLINIC | Age: 42
End: 2022-03-17

## 2022-03-17 NOTE — TELEPHONE ENCOUNTER
Patient brother Krunal Bhavna called requesting a new script for patients leg braces due to the velco straps are worn out and screws are coming out/and cracks   Any questions please call Méndez at [phone]

## 2022-03-18 DIAGNOSIS — M21.372 FOOT DROP, LEFT: Primary | ICD-10-CM

## 2022-03-21 ENCOUNTER — TELEPHONE (OUTPATIENT)
Dept: INTERNAL MEDICINE CLINIC | Facility: CLINIC | Age: 42
End: 2022-03-21

## 2022-03-21 NOTE — TELEPHONE ENCOUNTER
Order placed in red clerical folder to be faxed  Not sure if they want it sent somewhere specific since they are requesting it, please reach out to patient and send appropriately

## 2022-03-21 NOTE — TELEPHONE ENCOUNTER
Kiko/brother called because his brother needs a PPD done for [address] [facility]  Please call when order has been place so we can schedule an appointment on the nurse schedule  Tomy Kay says that he also has another form to be completed for the Adult Day Care but his brother was just in here in February for his check up  He wants to just drop it off to be completed

## 2022-03-23 ENCOUNTER — PATIENT OUTREACH (OUTPATIENT)
Dept: INTERNAL MEDICINE CLINIC | Facility: CLINIC | Age: 42
End: 2022-03-23

## 2022-03-23 ENCOUNTER — TELEPHONE (OUTPATIENT)
Dept: INTERNAL MEDICINE CLINIC | Facility: CLINIC | Age: 42
End: 2022-03-23

## 2022-03-23 ENCOUNTER — CLINICAL SUPPORT (OUTPATIENT)
Dept: INTERNAL MEDICINE CLINIC | Facility: CLINIC | Age: 42
End: 2022-03-23

## 2022-03-23 DIAGNOSIS — Z11.1 SCREENING FOR TUBERCULOSIS: Primary | ICD-10-CM

## 2022-03-23 PROCEDURE — 86580 TB INTRADERMAL TEST: CPT | Performed by: INTERNAL MEDICINE

## 2022-03-23 NOTE — PROGRESS NOTES
MONICA has met briefly with pt and brother Cecilia Cruz s/p his Nurse Visit this date  SW did not receive a call from pt's other brother Lia Contreras but Cecilia Cruz does relate they have been able to work out the [facility] schedule and additional aides for pt 's care at their home  Brother will be able to set up the additional PT services they wanted for pt  He shares they are working with pt's  Ms Jere Sullivan  He denies any other needs at present  Please re-consult MONICA if needed

## 2022-03-23 NOTE — PROGRESS NOTES
Patient is here today for PPD placement  PPD placed in right forearm  Patient does not have paperwork to be completed       TB test was placed on 3/23/2022 at 2:00 pm    Patient is aware to return for TB test reading on 3/25/2022  After 2:00 pm      Patient was provided a TB appointment reminder with this information

## 2022-03-24 ENCOUNTER — TELEPHONE (OUTPATIENT)
Dept: INTERNAL MEDICINE CLINIC | Facility: CLINIC | Age: 42
End: 2022-03-24

## 2022-03-24 NOTE — TELEPHONE ENCOUNTER
Patient brother Criss Espino "Kavon Tierney" called regarding a message that his other brother Selena Castro gave him yesterday 3/23/22 stating that in order for him to ask any questions about Patient conditions or medication or any type of Services  Arsen the other brother needed to fill out a Communication Consent Form  And put who name can ask about the patient  Kiko states he will be in the office before we close to  the one that I placed in the accordion under the letter B with his name on it  and I highlighted the areas he needs to fill out

## 2022-03-25 LAB
INDURATION: 0 MM
TB SKIN TEST: NEGATIVE

## 2022-03-28 NOTE — TELEPHONE ENCOUNTER
Late entry    3/25/22 - PPD read on 3/25/22 as negative, 0mm - copy of results given to patients caregiver Dr Koch Dr. Rankin Dr. Koch Dr Koch

## 2022-04-13 ENCOUNTER — TELEPHONE (OUTPATIENT)
Dept: INTERNAL MEDICINE CLINIC | Facility: CLINIC | Age: 42
End: 2022-04-13

## 2022-04-13 DIAGNOSIS — G80.0 SPASTIC QUADRIPLEGIC CEREBRAL PALSY (HCC): Primary | ICD-10-CM

## 2022-04-13 DIAGNOSIS — G89.29 CHRONIC PAIN OF RIGHT KNEE: ICD-10-CM

## 2022-04-13 DIAGNOSIS — M21.371 RIGHT FOOT DROP: ICD-10-CM

## 2022-04-13 DIAGNOSIS — M25.561 CHRONIC PAIN OF RIGHT KNEE: ICD-10-CM

## 2022-04-13 NOTE — TELEPHONE ENCOUNTER
Patient brother called to get another script for his Right leg  Brother Méndez called on 3/18/22 for a leg brace for the Left leg which he stated  it should have been for both legs  since it said Braces 3/18/2022  if you have any question please call [phone]

## 2022-06-06 ENCOUNTER — OFFICE VISIT (OUTPATIENT)
Dept: INTERNAL MEDICINE CLINIC | Facility: CLINIC | Age: 42
End: 2022-06-06

## 2022-06-06 VITALS
TEMPERATURE: 97.6 F | OXYGEN SATURATION: 96 % | HEART RATE: 78 BPM | SYSTOLIC BLOOD PRESSURE: 105 MMHG | DIASTOLIC BLOOD PRESSURE: 72 MMHG | HEIGHT: 63 IN | BODY MASS INDEX: 20.2 KG/M2 | WEIGHT: 114 LBS

## 2022-06-06 DIAGNOSIS — Z00.00 ANNUAL PHYSICAL EXAM: Primary | ICD-10-CM

## 2022-06-06 DIAGNOSIS — Z00.00 HEALTHCARE MAINTENANCE: ICD-10-CM

## 2022-06-06 PROCEDURE — 99396 PREV VISIT EST AGE 40-64: CPT | Performed by: INTERNAL MEDICINE

## 2022-06-06 NOTE — PROGRESS NOTES
[address] [facility] ELSY    NAME: Sharon Reasons  AGE: 39 y o  SEX: male  : 1980     DATE: 2022     Assessment and Plan:       1  Annual physical:  Patient has no complaints  He has a history of cerebral palsy and is wheelchair bound  Patient was accompanied by his brother  Patient does not communicate although he moves all 4 extremities  As per the brother, patient has no active complaints  Brother will bring form that needs to be filled out for his cerebral palsy program  6 months follow up  Problem List Items Addressed This Visit    None     Visit Diagnoses     Healthcare maintenance    -  Primary    Relevant Orders    CBC and differential    Comprehensive metabolic panel          Immunizations and preventive care screenings were discussed with patient today  Appropriate education was printed on patient's after visit summary  Counseling:  · pt has cerebral palsy  Return in about 6 months (around 2022) for Next scheduled follow up  Chief Complaint:     Chief Complaint   Patient presents with    Annual Exam      History of Present Illness: This is 39 year male with past medical history of scoliosis and cerebral palsy is here for annual physical   Patient was accompanied by his brother  Patient is wheelchair-bound and does not communicate  No active complaints  Adult Annual Physical   Patient here for a comprehensive physical exam  The patient reports no problems  Diet and Physical Activity  · Diet/Nutrition: well balanced diet  · Exercise: no formal exercise  Depression Screening  PHQ-2/9 Depression Screening         General Health  · Sleep: sleeps well  · Hearing: normal - bilateral   · Vision: no vision problems  · Dental: regular dental visits          Health  · Symptoms include: none     Review of Systems:     Review of Systems   Constitutional: Negative for activity change, chills, diaphoresis, fatigue and fever  HENT: Negative for congestion, rhinorrhea, sinus pressure and sinus pain  Respiratory: Negative for apnea, cough, shortness of breath and stridor  Cardiovascular: Negative for chest pain, palpitations and leg swelling  Gastrointestinal: Negative for abdominal distention, abdominal pain, blood in stool, constipation and diarrhea  Endocrine: Negative for cold intolerance, heat intolerance and polyuria  Genitourinary: Negative for difficulty urinating  Musculoskeletal: Negative for arthralgias, back pain, joint swelling and myalgias  Skin: Negative for color change, pallor, rash and wound  Neurological: Negative for dizziness, seizures, syncope, light-headedness, numbness and headaches  Psychiatric/Behavioral: Negative for agitation and hallucinations  The patient is not nervous/anxious and is not hyperactive  Past Medical History:     Past Medical History:   Diagnosis Date    [facility]     Leukopenia     Last Assessed:  10/9/15    Thrombocytopenia ([facility] [address] )     Last Assessed:  10/13/14    Tired 11/29/2019      Past Surgical History:     History reviewed  No pertinent surgical history  Family History:     History reviewed  No pertinent family history     Social History:     Social History     Socioeconomic History    Marital status: Single     Spouse name: None    Number of children: None    Years of education: None    Highest education level: None   Occupational History    None   Tobacco Use    Smoking status: Never Smoker    Smokeless tobacco: Never Used   Vaping Use    Vaping Use: Never used   Substance and Sexual Activity    Alcohol use: No    Drug use: No    Sexual activity: Never   Other Topics Concern    None   Social History Narrative    None     Social Determinants of Health     Financial Resource Strain: Low Risk     Difficulty of Paying Living Expenses: Not hard at all   Food Insecurity: No Food Insecurity    Worried About Running Out of Food in the Last Year: Never true    Ida of Food in the Last Year: Never true   Transportation Needs: No Transportation Needs    Lack of Transportation (Medical): No    Lack of Transportation (Non-Medical): No   Physical Activity: Not on file   Stress: Not on file   Social Connections: Not on file   Intimate Partner Violence: Not on file   Housing Stability: Low Risk     Unable to Pay for Housing in the Last Year: No    Number of Places Lived in the Last Year: 1    Unstable Housing in the Last Year: No      Current Medications:     Current Outpatient Medications   Medication Sig Dispense Refill    ibuprofen (MOTRIN) 600 mg tablet as needed      Misc  Devices [facility]) MISC by Does not apply route (Patient not taking: No sig reported)       No current facility-administered medications for this visit  Allergies: Allergies   Allergen Reactions    Chloroquine      Annotation - [id number]: ITCHY      Physical Exam:     /72 (BP Location: Right arm, Patient Position: Sitting, Cuff Size: Standard)   Pulse 78   Temp 97 6 °F (36 4 °C) (Temporal)   Ht 5' 3" (1 6 m)   Wt 51 7 kg (114 lb)   SpO2 96%   BMI 20 19 kg/m²     Physical Exam  Constitutional:       General: He is not in acute distress  Appearance: He is well-developed  He is not diaphoretic  HENT:      Head: Normocephalic and atraumatic  Nose: Nose normal       Mouth/Throat:      Pharynx: No oropharyngeal exudate  Eyes:      General: No scleral icterus  Right eye: No discharge  Left eye: No discharge  Cardiovascular:      Rate and Rhythm: Normal rate and regular rhythm  Heart sounds: Normal heart sounds  No murmur heard  No friction rub  No gallop  Pulmonary:      Effort: Pulmonary effort is normal  No respiratory distress  Breath sounds: No stridor  No wheezing or rales  Abdominal:      General: Bowel sounds are normal  There is no distension  Palpations: Abdomen is soft  Tenderness: There is no abdominal tenderness  There is no guarding  Musculoskeletal:         General: Normal range of motion  Cervical back: Normal range of motion and neck supple  Comments: Scoliosis noted   Skin:     General: Skin is warm  Findings: No erythema  Neurological:      Mental Status: He is alert  Comments: Patient has cerebral palsy  He moves all extremities although does not follow any command            Denise Tang, [address]

## 2022-06-06 NOTE — PATIENT INSTRUCTIONS
 Follow up in 6 months  Wellness Visit for Adults   AMBULATORY CARE:   A wellness visit  is when you see your healthcare provider to get screened for health problems  Your healthcare provider will also give you advice on how to stay healthy  Write down your questions so you remember to ask them  Ask your healthcare provider how often you should have a wellness visit  What happens at a wellness visit:  Your healthcare provider will ask about your health, and your family history of health problems  This includes high blood pressure, heart disease, and cancer  He or she will ask if you have symptoms that concern you, if you smoke, and about your mood  You may also be asked about your intake of medicines, supplements, food, and alcohol  Any of the following may be done: Your weight  will be checked  Your height may also be checked so your body mass index (BMI) can be calculated  Your BMI shows if you are at a healthy weight  Your blood pressure  and heart rate will be checked  Your temperature may also be checked  Blood and urine tests  may be done  Blood tests may be done to check your cholesterol levels  Abnormal cholesterol levels increase your risk for heart disease and stroke  You may also need a blood or urine test to check for diabetes if you are at increased risk  Urine tests may be done to look for signs of an infection or kidney disease  A physical exam  includes checking your heartbeat and lungs with a stethoscope  Your healthcare provider may also check your skin to look for sun damage  Screening tests  may be recommended  A screening test is done to check for diseases that may not cause symptoms  The screening tests you may need depend on your age, gender, family history, and lifestyle habits  For example, colorectal screening may be recommended if you are 48years old or older  Screening tests you need if you are a woman:   A Pap smear  is used to screen for cervical cancer   Pap smears are usually done every 3 to 5 years depending on your age  You may need them more often if you have had abnormal Pap smear test results in the past  Ask your healthcare provider how often you should have a Pap smear  A mammogram  is an x-ray of your breasts to screen for breast cancer  Experts recommend mammograms every 2 years starting at age 48 years  You may need a mammogram at age 52 years or younger if you have an increased risk for breast cancer  Talk to your healthcare provider about when you should start having mammograms and how often you need them  Vaccines you may need:   Get an influenza vaccine  every year  The influenza vaccine protects you from the flu  Several types of viruses cause the flu  The viruses change over time, so new vaccines are made each year  Get a tetanus-diphtheria (Td) booster vaccine  every 10 years  This vaccine protects you against tetanus and diphtheria  Tetanus is a severe infection that may cause painful muscle spasms and lockjaw  Diphtheria is a severe bacterial infection that causes a thick covering in the back of your mouth and throat  Get a human papillomavirus (HPV) vaccine  if you are female and aged 23 to 32 or male 23 to 24 and never received it  This vaccine protects you from HPV infection  HPV is the most common infection spread by sexual contact  HPV may also cause vaginal, penile, and anal cancers  Get a pneumococcal vaccine  if you are aged 72 years or older  The pneumococcal vaccine is an injection given to protect you from pneumococcal disease  Pneumococcal disease is an infection caused by pneumococcal bacteria  The infection may cause pneumonia, meningitis, or an ear infection  Get a shingles vaccine  if you are 60 or older, even if you have had shingles before  The shingles vaccine is an injection to protect you from the varicella-zoster virus  This is the same virus that causes chickenpox   Shingles is a painful rash that develops in people who had chickenpox or have been exposed to the virus  How to eat healthy:  My Plate is a model for planning healthy meals  It shows the types and amounts of foods that should go on your plate  Fruits and vegetables make up about half of your plate, and grains and protein make up the other half  A serving of dairy is included on the side of your plate  The amount of calories and serving sizes you need depends on your age, gender, weight, and height  Examples of healthy foods are listed below:  Eat a variety of vegetables  such as dark green, red, and orange vegetables  You can also include canned vegetables low in sodium (salt) and frozen vegetables without added butter or sauces  Eat a variety of fresh fruits , canned fruit in 100% juice, frozen fruit, and dried fruit  Include whole grains  At least half of the grains you eat should be whole grains  Examples include whole-wheat bread, wheat pasta, brown rice, and whole-grain cereals such as oatmeal     Eat a variety of protein foods such as seafood (fish and shellfish), lean meat, and poultry without skin (turkey and chicken)  Examples of lean meats include pork leg, shoulder, or tenderloin, and beef round, sirloin, tenderloin, and extra lean ground beef  Other protein foods include eggs and egg substitutes, beans, peas, soy products, nuts, and seeds  Choose low-fat dairy products such as skim or 1% milk or low-fat yogurt, cheese, and cottage cheese  Limit unhealthy fats  such as butter, hard margarine, and shortening  Exercise:  Exercise at least 30 minutes per day on most days of the week  Some examples of exercise include walking, biking, dancing, and swimming  You can also fit in more physical activity by taking the stairs instead of the elevator or parking farther away from stores  Include muscle strengthening activities 2 days each week  Regular exercise provides many health benefits   It helps you manage your weight, and decreases your risk for type 2 diabetes, heart disease, stroke, and high blood pressure  Exercise can also help improve your mood  Ask your healthcare provider about the best exercise plan for you  General health and safety guidelines:   Do not smoke  Nicotine and other chemicals in cigarettes and cigars can cause lung damage  Ask your healthcare provider for information if you currently smoke and need help to quit  E-cigarettes or smokeless tobacco still contain nicotine  Talk to your healthcare provider before you use these products  Limit alcohol  A drink of alcohol is 12 ounces of beer, 5 ounces of wine, or 1½ ounces of liquor  Lose weight, if needed  Being overweight increases your risk of certain health conditions  These include heart disease, high blood pressure, type 2 diabetes, and certain types of cancer  Protect your skin  Do not sunbathe or use tanning beds  Use sunscreen with a SPF 15 or higher  Apply sunscreen at least 15 minutes before you go outside  Reapply sunscreen every 2 hours  Wear protective clothing, hats, and sunglasses when you are outside  Drive safely  Always wear your seatbelt  Make sure everyone in your car wears a seatbelt  A seatbelt can save your life if you are in an accident  Do not use your cell phone when you are driving  This could distract you and cause an accident  Pull over if you need to make a call or send a text message  Practice safe sex  Use latex condoms if are sexually active and have more than one partner  Your healthcare provider may recommend screening tests for sexually transmitted infections (STIs)  Wear helmets, lifejackets, and protective gear  Always wear a helmet when you ride a bike or motorcycle, go skiing, or play sports that could cause a head injury  Wear protective equipment when you play sports  Wear a lifejacket when you are on a boat or doing water sports      © Copyright [employer] 2022 Information is for End User's use only and may not be sold, redistributed or otherwise used for commercial purposes  All illustrations and images included in CareNotes® are the copyrighted property of A D A M , Inc  or Kareem Duque  The above information is an  only  It is not intended as medical advice for individual conditions or treatments  Talk to your doctor, nurse or pharmacist before following any medical regimen to see if it is safe and effective for you    

## 2022-06-23 ENCOUNTER — TELEPHONE (OUTPATIENT)
Dept: INTERNAL MEDICINE CLINIC | Facility: CLINIC | Age: 42
End: 2022-06-23

## 2022-06-23 NOTE — TELEPHONE ENCOUNTER
Virginia Garrison (communication consent in the file) the brother of Sherita Cortez  states that the patient needs another TB skin test since the adult day care does not accept anything done over 2 months ago --- PPD test read 3/25/22 ordered by Dr Shimon Khalil     Please check into this and call the patient to advise      thanks

## 2022-06-24 NOTE — TELEPHONE ENCOUNTER
Patient family member(I believe he said he was patient father) called to give the name of the Adult  Services   Which is called     [facility] at [phone]

## 2022-06-24 NOTE — TELEPHONE ENCOUNTER
Called and spoke to Madison Li  He advised that all patient needs is forms to be filled out with correct dates of when vaccines were administered  Fatoumata Brand our fax number and he will sending over paper work to be completed

## 2022-06-24 NOTE — TELEPHONE ENCOUNTER
LMOM for Kiko to call back   We need the name and phone number of the adult day program that he goes to so we can contact them

## 2022-06-27 NOTE — TELEPHONE ENCOUNTER
Folder Color- Green     Name of [facility]    Form to be filled out by- Dr Mani Blankenship    Form to be Faxed [phone]

## 2022-06-27 NOTE — TELEPHONE ENCOUNTER
Forms completed by Dr Jigna Orourke and placed in [facility] clerical folder to be faxed

## 2022-10-12 PROBLEM — H61.23 BILATERAL IMPACTED CERUMEN: Status: RESOLVED | Noted: 2021-09-20 | Resolved: 2022-10-12

## 2023-01-17 ENCOUNTER — APPOINTMENT (OUTPATIENT)
Dept: LAB | Facility: CLINIC | Age: 43
End: 2023-01-17

## 2023-01-17 DIAGNOSIS — Z00.00 HEALTHCARE MAINTENANCE: ICD-10-CM

## 2023-01-17 LAB
ALBUMIN SERPL BCP-MCNC: 3.8 G/DL (ref 3.5–5)
ALP SERPL-CCNC: 49 U/L (ref 34–104)
ALT SERPL W P-5'-P-CCNC: 9 U/L (ref 7–52)
ANION GAP SERPL CALCULATED.3IONS-SCNC: 4 MMOL/L (ref 4–13)
AST SERPL W P-5'-P-CCNC: 17 U/L (ref 13–39)
BASOPHILS # BLD AUTO: 0.03 THOUSANDS/ÂΜL (ref 0–0.1)
BASOPHILS NFR BLD AUTO: 1 % (ref 0–1)
BILIRUB SERPL-MCNC: 1 MG/DL (ref 0.2–1)
BUN SERPL-MCNC: 13 MG/DL (ref 5–25)
CALCIUM SERPL-MCNC: 9.2 MG/DL (ref 8.4–10.2)
CHLORIDE SERPL-SCNC: 107 MMOL/L (ref 96–108)
CO2 SERPL-SCNC: 27 MMOL/L (ref 21–32)
CREAT SERPL-MCNC: 0.99 MG/DL (ref 0.6–1.3)
EOSINOPHIL # BLD AUTO: 0.11 THOUSAND/ÂΜL (ref 0–0.61)
EOSINOPHIL NFR BLD AUTO: 4 % (ref 0–6)
ERYTHROCYTE [DISTWIDTH] IN BLOOD BY AUTOMATED COUNT: 11.5 % (ref 11.6–15.1)
GFR SERPL CREATININE-BSD FRML MDRD: 93 ML/MIN/1.73SQ M
GLUCOSE SERPL-MCNC: 97 MG/DL (ref 65–140)
HCT VFR BLD AUTO: 43.9 % (ref 36.5–49.3)
HGB BLD-MCNC: 14.9 G/DL (ref 12–17)
IMM GRANULOCYTES # BLD AUTO: 0.01 THOUSAND/UL (ref 0–0.2)
IMM GRANULOCYTES NFR BLD AUTO: 0 % (ref 0–2)
LYMPHOCYTES # BLD AUTO: 1.41 THOUSANDS/ÂΜL (ref 0.6–4.47)
LYMPHOCYTES NFR BLD AUTO: 48 % (ref 14–44)
MCH RBC QN AUTO: 33.1 PG (ref 26.8–34.3)
MCHC RBC AUTO-ENTMCNC: 33.9 G/DL (ref 31.4–37.4)
MCV RBC AUTO: 98 FL (ref 82–98)
MONOCYTES # BLD AUTO: 0.4 THOUSAND/ÂΜL (ref 0.17–1.22)
MONOCYTES NFR BLD AUTO: 13 % (ref 4–12)
NEUTROPHILS # BLD AUTO: 1.03 THOUSANDS/ÂΜL (ref 1.85–7.62)
NEUTS SEG NFR BLD AUTO: 34 % (ref 43–75)
NRBC BLD AUTO-RTO: 0 /100 WBCS
PLATELET # BLD AUTO: 141 THOUSANDS/UL (ref 149–390)
PMV BLD AUTO: 10 FL (ref 8.9–12.7)
POTASSIUM SERPL-SCNC: 3.7 MMOL/L (ref 3.5–5.3)
PROT SERPL-MCNC: 6.4 G/DL (ref 6.4–8.4)
RBC # BLD AUTO: 4.5 MILLION/UL (ref 3.88–5.62)
SODIUM SERPL-SCNC: 138 MMOL/L (ref 135–147)
WBC # BLD AUTO: 2.99 THOUSAND/UL (ref 4.31–10.16)

## 2023-01-19 ENCOUNTER — OFFICE VISIT (OUTPATIENT)
Dept: INTERNAL MEDICINE CLINIC | Facility: CLINIC | Age: 43
End: 2023-01-19

## 2023-01-19 VITALS — TEMPERATURE: 97.9 F

## 2023-01-19 DIAGNOSIS — H61.23 BILATERAL IMPACTED CERUMEN: ICD-10-CM

## 2023-01-19 DIAGNOSIS — D70.9 NEUTROPENIA, UNSPECIFIED TYPE (HCC): ICD-10-CM

## 2023-01-19 DIAGNOSIS — Z23 NEED FOR INFLUENZA VACCINATION: ICD-10-CM

## 2023-01-19 DIAGNOSIS — G80.0 SPASTIC QUADRIPLEGIC CEREBRAL PALSY (HCC): Primary | ICD-10-CM

## 2023-01-19 RX ORDER — PENICILLIN V POTASSIUM 250 MG/5ML
SOLUTION, RECONSTITUTED, ORAL ORAL
Refills: 0 | Status: CANCELLED | OUTPATIENT
Start: 2023-01-19

## 2023-01-19 NOTE — ASSESSMENT & PLAN NOTE
Mikie noted b/l  S/p irrigation in office  Ordered cebrox, though not covered, family willing to pay for rx

## 2023-01-19 NOTE — PROGRESS NOTES
[facility]  INTERNAL MEDICINE OFFICE VISIT     PATIENT INFORMATION     Liset Fairchild   43 y o  male   MRN: [id number]    ASSESSMENT/PLAN     1  Spastic quadriplegic cerebral palsy [facility])  Assessment & Plan:  Nonverbal and generally spends time in wheelchair at baseline  Family very active in care    Dependent on all IADLs and most ADLS (occasionally able to feed self and signal when need bathroom)  Home VNA referral given  Podiatry referral given  Informed brother to search [facility] for fork/spoon assistive eating devices  Will fill out handicap placard  Patient does occasionally ambulate with walker and family interested in home VNA to improve strength and mobility/ROM  F/u 6 months  Repeat bmp, cbc, and b12     Orders:  -     Ambulatory Referral to Podiatry; Future  -     Referral to [address] [place] [address]; Future  -     Ambulatory Referral to Dentistry; Future  -     Vitamin D 25 hydroxy; Future  -     Basic metabolic panel; Future  -     CBC and differential; Future  -     Vitamin B12; Future  -     carbamide peroxide (DEBROX) 6 5 % otic solution; Administer 5 drops into both ears 2 (two) times a day    2  Need for influenza vaccination  -     influenza vaccine, quadrivalent, 0 5 mL, preservative-free, for adult and pediatric patients 6 mos+ (AFLURIA, FLUARIX, FLULAVAL, FLUZONE)    3  Neutropenia, unspecified type ([address] )  Assessment & Plan:  Baseline  Patient and family did see Dr Pearlie Olszewski of hematology who recommended bone marrow biopsy, family declined for patient quality of life  Cbc repeat  No current signs of infection      4   Bilateral impacted cerumen  Assessment & Plan:  Cerumen noted b/l  S/p irrigation in office  Ordered cebrox, though not covered, family willing to pay for rx          Schedule a follow-up appointment in 6 months with me for annual physical exam     HEALTH MAINTENANCE     Immunization History   Administered Date(s) Administered   • COVID-19 PFIZER VACCINE 0 3 ML IM 04/17/2021, 05/08/2021   • INFLUENZA 11/01/2015, 10/25/2017   • Influenza Quadrivalent Preservative Free 3 years and older IM 10/06/2016   • Influenza Split 09/01/2014   • Influenza, injectable, quadrivalent, preservative free 0 5 mL 01/19/2023   • Influenza, seasonal, injectable 10/13/2014, 10/09/2015   • Tuberculin Skin Test-PPD Intradermal 03/23/2022     Immunizations:  · Flu given today  Screening:  Needs HIV       CHIEF COMPLAINT     Chief Complaint   Patient presents with   • Follow-up      HISTORY OF PRESENT ILLNESS     Patient is a 43year old male with PMHx spastic cerebral palsy non verbal and wheelchair bound at baseline and baseline neutropenia who presents today as a follow up of his chronic conditions  Brother, Alysia Cook (pronounced similarly to [facility]) asking for handicap placard and help with home PT  Family takes very good care of patient, exercising him and massaging him daily  Brother does not notice any changes in mood or behavior  They are currently searching for a day program  No acute complaints    REVIEW OF SYSTEMS     Review of Systems   Unable to perform ROS: Patient nonverbal     OBJECTIVE     Vitals:    01/19/23 0842   Temp: 97 9 °F (36 6 °C)   TempSrc: Temporal     Physical Exam  Constitutional:       General: He is not in acute distress  Appearance: Normal appearance  He is not ill-appearing  HENT:      Right Ear: Tympanic membrane and ear canal normal  There is no impacted cerumen  Left Ear: Tympanic membrane and ear canal normal  There is impacted cerumen  Mouth/Throat:      Mouth: Mucous membranes are moist    Eyes:      Conjunctiva/sclera: Conjunctivae normal       Pupils: Pupils are equal, round, and reactive to light  Cardiovascular:      Rate and Rhythm: Normal rate and regular rhythm  Heart sounds: No murmur heard  Pulmonary:      Effort: Pulmonary effort is normal       Breath sounds: No wheezing or rales     Abdominal:      General: Bowel sounds are normal       Palpations: Abdomen is soft  Tenderness: There is no abdominal tenderness  There is no guarding  Musculoskeletal:      Right lower leg: No edema  Left lower leg: No edema  Comments: Strength UE 5/5   Skin:     General: Skin is warm and dry  Capillary Refill: Capillary refill takes less than 2 seconds  Neurological:      Mental Status: He is alert  Comments: Follow commands, communicates with brother through personal sign language, braces b/l LE and wheelchair bound at baseline   Psychiatric:         Mood and Affect: Mood normal          Behavior: Behavior normal        CURRENT MEDICATIONS     Current Outpatient Medications:   •  carbamide peroxide (DEBROX) 6 5 % otic solution, Administer 5 drops into both ears 2 (two) times a day, Disp: 15 mL, Rfl: 0  •  ibuprofen (MOTRIN) 600 mg tablet, as needed, Disp: , Rfl:   •  Misc  Devices [facility]) MISC, by Does not apply route (Patient not taking: Reported on 6/25/2021), Disp: , Rfl:     PAST MEDICAL & SURGICAL HISTORY     Past Medical History:   Diagnosis Date   • Cerebral palsy ([facility] )    • Leukopenia     Last Assessed:  10/9/15   • Thrombocytopenia ([facility] [address] )     Last Assessed:  10/13/14   • Tired 11/29/2019     History reviewed  No pertinent surgical history    SOCIAL & FAMILY HISTORY     Social History     Socioeconomic History   • Marital status: Single     Spouse name: Not on file   • Number of children: Not on file   • Years of education: Not on file   • Highest education level: Not on file   Occupational History   • Not on file   Tobacco Use   • Smoking status: Never   • Smokeless tobacco: Never   Vaping Use   • Vaping Use: Never used   Substance and Sexual Activity   • Alcohol use: No   • Drug use: No   • Sexual activity: Never   Other Topics Concern   • Not on file   Social History Narrative   • Not on file     Social Determinants of Health     Financial Resource Strain: Low Risk    • Difficulty of Paying Living Expenses: Not hard at all   Food Insecurity: No Food Insecurity   • Worried About [address] in the Last Year: Never true   • Ran Out of Food in the Last Year: Never true   Transportation Needs: No Transportation Needs   • Lack of Transportation (Medical): No   • Lack of Transportation (Non-Medical): No   Physical Activity: Not on file   Stress: Not on file   Social Connections: Not on file   Intimate Partner Violence: Not on file   Housing Stability: Low Risk    • Unable to Pay for Housing in the Last Year: No   • Number of Places Lived in the Last Year: 1   • Unstable Housing in the Last Year: No     Social History     Substance and Sexual Activity   Alcohol Use No     Substance and Sexual Activity   Alcohol Use No        Substance and Sexual Activity   Drug Use No     Social History     Tobacco Use   Smoking Status Never   Smokeless Tobacco Never     History reviewed  No pertinent family history   ==  DO Cheyanne Vo [address] [facility]  [address] [facility] , [facility] [address] [facility], [address] [facility]  Office: [phone]  Fax: [phone]

## 2023-01-19 NOTE — ASSESSMENT & PLAN NOTE
Baseline  Patient and family did see Dr Clifford Hurley of hematology who recommended bone marrow biopsy, family declined for patient quality of life  Cbc repeat  No current signs of infection

## 2023-01-20 ENCOUNTER — HOME HEALTH ADMISSION (OUTPATIENT)
Dept: HOME HEALTH SERVICES | Facility: HOME HEALTHCARE | Age: 43
End: 2023-01-20

## 2023-01-20 ENCOUNTER — HOME CARE VISIT (OUTPATIENT)
Dept: HOME HEALTH SERVICES | Facility: HOME HEALTHCARE | Age: 43
End: 2023-01-20

## 2023-01-20 NOTE — CASE COMMUNICATION
This is for informational purposes only  Left a message for the patient's brother that the physical therapist will call to schedule a home PT [facility] visit for Monday  New SOC date will be 1/23/23   Thank you

## 2023-01-23 ENCOUNTER — HOME CARE VISIT (OUTPATIENT)
Dept: HOME HEALTH SERVICES | Facility: HOME HEALTHCARE | Age: 43
End: 2023-01-23

## 2023-01-23 NOTE — CASE COMMUNICATION
Notification of Assess not Admit    [facility]’s VNA has assessed your patient for Home Health services and has determined the patient is not eligible for service due to the following    Arrived to pts home with pt and pts x2 brothers present  Pt currently is in a electric wc and pts brother ambulate pt daily with use of personal DME and A from brother  Pt has 24 hour care from family and personal HHA  Personal HHA is at pts home for A fr om 3pm to 11pm daily to A with meal prep,cooking,light household chores,bathing,dressing and companionship  After speaking and discussion pt and pts family goals for PT, pt would benefit from outpt PT program  Per pts family, pt was going to outpt PT in the past  Pt has available teransportation from family to A and use of electric wc for community distances  Pt has all current medications in the home  Pt is nonverbal and pts brothers  have no questions and/or concerns about current medication regimen  Older brother ("T") inquired and asked questions concerning stair glide  Referred pts family discuss stair glide options with PCP and PCP office   Pts brothers agreed  Pt would benefit from outpt PT and outpt OT services at this time  pt and pts family are in agreement  Pt would also benefit from neurology consult  No skilled home PT and no skilled home  OT services needed at this time, DC pt from skilled home PT and skilled home OT      Verenice Leslie DPT

## 2023-01-24 NOTE — CASE COMMUNICATION
Please provide pt with outpt PT and outpt OT script orde,  in order to begin outpt therapy services      Thank you,  Mari KOCHT

## 2023-01-25 ENCOUNTER — TELEPHONE (OUTPATIENT)
Dept: INTERNAL MEDICINE CLINIC | Facility: CLINIC | Age: 43
End: 2023-01-25

## 2023-01-25 NOTE — TELEPHONE ENCOUNTER
Patients brother called to request the doctor refer him to see Neurology due to his Spastic quadriplegic cerebral palsy  They would like him checked out by neurology to make sure there are no issues  It was a recommendation by a physical therapist  Please let him know if the order is placed

## 2023-01-26 DIAGNOSIS — G80.0 SPASTIC QUADRIPLEGIC CEREBRAL PALSY (HCC): Primary | ICD-10-CM

## 2023-01-26 NOTE — TELEPHONE ENCOUNTER
Medical Week 3 Survey      Responses   Facility patient discharged from?  [facility]   Does the patient have one of the following disease processes/diagnoses(primary or secondary)?  Other   Week 3 attempt successful?  Yes   Call start time  1516   Call end time  1520   Person spoke with today (if not patient) and relationship  Mojgan   Meds reviewed with patient/caregiver?  Yes   Has the patient kept scheduled appointments due by today?  Yes   Comments  Has appt with Dr. Larkin on 10/16/18   Psychosocial issues?  No   What is the patient's perception of their health status since discharge?  Same   Additional teach back comments  has had 2 diarrhea stools today.  Pt. weak.  seeing Dr. Larkin next week.  Will have pt seen it s/s worsen   Week 3 Call Completed?  Yes          Yolanda Mae RN   Referral for neurology placed, thanks!

## 2023-01-26 NOTE — TELEPHONE ENCOUNTER
Vm was left for pts brother advising that order is in chart and provided the number to call and schedule an appt

## 2023-01-30 ENCOUNTER — TELEPHONE (OUTPATIENT)
Dept: INTERNAL MEDICINE CLINIC | Facility: CLINIC | Age: 43
End: 2023-01-30

## 2023-01-30 ENCOUNTER — CONSULT (OUTPATIENT)
Dept: MULTI SPECIALTY CLINIC | Facility: CLINIC | Age: 43
End: 2023-01-30

## 2023-01-30 VITALS — HEART RATE: 62 BPM | DIASTOLIC BLOOD PRESSURE: 79 MMHG | TEMPERATURE: 97.7 F | SYSTOLIC BLOOD PRESSURE: 123 MMHG

## 2023-01-30 DIAGNOSIS — G80.0 SPASTIC QUADRIPLEGIC CEREBRAL PALSY (HCC): ICD-10-CM

## 2023-01-30 DIAGNOSIS — L60.3 NAIL DYSTROPHY: Primary | ICD-10-CM

## 2023-01-30 NOTE — PROGRESS NOTES
Assessment/Plan:  -Patient presents with his brother for evaluation of the lower extremity  Patients brother reports that the straps of his MAFO are rubbing against the skin  We gave patient moleskin for extra padding and checked the bracing, which appear intact  Patient is starting physical therapy soon and we would like to give him time to see how he progresses  Patients nails are also thickened, but well trimmed at this time  We will trim in the future if required   -Reviewed patients CBC from 1/17 and Vitamin D 25 from 1/19  -Follow up in 3 months      Diagnoses and all orders for this visit:    Nail dystrophy    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[facility] [address] )  -     Ambulatory Referral to Podiatry          Subjective:      Patient ID: Yuliana Horner is a 43 y o  male  Patient presents to the clinic with his brother who speaks for him  Patients brother reports that his nails are becoming more thickened and he is having difficulty cutting them for his brother  He reports that they can become painful for him as he spent most of his life crawling on the floor while living in [place]  Patients brother is also worried about the bilateral bracing as they had it redone a few months ago  He states that the areas of padding are irritating him  He would like us to check his feet  Patient also has some foot drop to the right foot  Patient denies any other pedal complaints       The following portions of the patient's history were reviewed and updated as appropriate: allergies, current medications, past family history, past medical history, past social history, past surgical history and problem list     Review of Systems   Constitutional: Negative  HENT: Negative  Eyes: Negative  Respiratory: Negative  Cardiovascular: Negative  Endocrine: Negative  Musculoskeletal: Negative  Skin: Negative            Objective:      /79 (BP Location: Left arm, Patient Position: Sitting, Cuff Size: Standard) Pulse 62   Temp 97 7 °F (36 5 °C) (Temporal)          Physical Exam  Constitutional:       Appearance: He is normal weight  HENT:      Nose: Nose normal       Mouth/Throat:      Mouth: Mucous membranes are dry  Eyes:      Extraocular Movements: Extraocular movements intact  Cardiovascular:      Pulses: Normal pulses  Dorsalis pedis pulses are 2+ on the right side and 2+ on the left side  Posterior tibial pulses are 2+ on the right side and 2+ on the left side  Heart sounds: Normal heart sounds  Pulmonary:      Effort: Pulmonary effort is normal       Breath sounds: Normal breath sounds  Abdominal:      General: Abdomen is flat  Palpations: Abdomen is soft  Musculoskeletal:         General: Deformity present  Right foot: Normal range of motion  Foot drop present  Left foot: Normal range of motion  Feet:      Right foot:      Skin integrity: Callus present  Toenail Condition: Right toenails are long  Left foot:      Skin integrity: Callus present  Toenail Condition: Left toenails are long  Neurological:      Mental Status: He is alert  Mental status is at baseline  Sensory: Sensory deficit present        Gait: Gait abnormal       Deep Tendon Reflexes: Reflexes abnormal          Biomechanical Exam of LE:  Hip ROM WNL Bilateral, external and internal rotation about equal at 45° b/l  Knee ROM WNL Bilateral, no hyperextension noted b/l, no genu varum/valgum noted b/l  Ankle dorsiflexion with knee extended is able to get pass vertical on right and able to get pass vertical on left  Ankle dorsiflexion with knee flexed is unable to get past  on right and limited and unable to get pass vertical on left  Malleolar positioning is WNL b/l  STJ ROM WNL b/l, heel inversion is approximately 20° on right and 20° on left; heel eversion is approximately 10° on right and 10° on left; neutral calcaneal stance position is 2 inverted °   1st ray ROM WNL b/l, able to dorsiflex/plantarflex b/l  Tibial varum is 0° b/l, Resting calcaneal stance position is varus and approximately 2° on right and varus and approximately 2° on left  Gait analysis: restricted ambulation  Gross deformity noted: varus, equinus

## 2023-01-30 NOTE — TELEPHONE ENCOUNTER
Folder Color- Red    Name of Form- Disability Parking Placard application     Form to be filled out by- Ming    Form to be Faxed patient will      Patient made aware of 10 business day policy

## 2023-01-30 NOTE — LETTER
January 30, 2023     Darin Hernandez    Patient: Fausto Romero   YOB: 1980   Date of Visit: 1/30/2023       Dear Dr Saira Zhou: Thank you for referring Fausto Romero to me for evaluation  Below are my notes for this consultation  If you have questions, please do not hesitate to call me  I look forward to following your patient along with you  Sincerely,        Ben Cohen DPM        CC: No Recipients  Destin Rahman [place], [place]  1/30/2023  5:10 PM  Incomplete  Assessment/Plan:  -Patient presents with his brother for evaluation of the lower extremity  Patients brother reports that the straps of his MAFO are rubbing against the skin  We gave patient moleskin for extra padding and checked the bracing, which appear intact  Patient is starting physical therapy soon and we would like to give him time to see how he progresses  Patients nails are also thickened, but well trimmed at this time  We will trim in the future if required   -Follow up in 3 months     Diagnoses and all orders for this visit:    Nail dystrophy    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[address] )  -     Ambulatory Referral to Podiatry         Subjective:     Patient ID: Fausto Romero is a 43 y o  male  Patient presents to the clinic with his brother who speaks for him  Patients brother reports that his nails are becoming more thickened and he is having difficulty cutting them for his brother  He reports that they can become painful for him as he spent most of his life crawling on the floor while living in [place]  Patients brother is also worried about the bilateral bracing as they had it redone a few months ago  He states that the areas of padding are irritating him  He would like us to check his feet  Patient also has some foot drop to the right foot   Patient denies any other pedal complaints       The following portions of the patient's history were reviewed and updated as appropriate: allergies, current medications, past family history, past medical history, past social history, past surgical history and problem list     Review of Systems   Constitutional: Negative  HENT: Negative  Eyes: Negative  Respiratory: Negative  Cardiovascular: Negative  Endocrine: Negative  Musculoskeletal: Negative  Skin: Negative  Objective:      /79 (BP Location: Left arm, Patient Position: Sitting, Cuff Size: Standard)   Pulse 62   Temp 97 7 °F (36 5 °C) (Temporal)         Physical Exam  Constitutional:       Appearance: He is normal weight  HENT:      Nose: Nose normal       Mouth/Throat:      Mouth: Mucous membranes are dry  Eyes:      Extraocular Movements: Extraocular movements intact  Cardiovascular:      Pulses: Normal pulses  Dorsalis pedis pulses are 2+ on the right side and 2+ on the left side  Posterior tibial pulses are 2+ on the right side and 2+ on the left side  Heart sounds: Normal heart sounds  Pulmonary:      Effort: Pulmonary effort is normal       Breath sounds: Normal breath sounds  Abdominal:      General: Abdomen is flat  Palpations: Abdomen is soft  Musculoskeletal:         General: Deformity present  Right foot: Normal range of motion  Foot drop present  Left foot: Normal range of motion  Feet:      Right foot:      Skin integrity: Callus present  Toenail Condition: Right toenails are long  Left foot:      Skin integrity: Callus present  Toenail Condition: Left toenails are long  Neurological:      Mental Status: He is alert  Mental status is at baseline  Sensory: Sensory deficit present        Gait: Gait abnormal       Deep Tendon Reflexes: Reflexes abnormal         Biomechanical Exam of LE:  Hip ROM WNL Bilateral, external and internal rotation about equal at 45° b/l  Knee ROM WNL Bilateral, no hyperextension noted b/l, no genu varum/valgum noted b/l  Ankle dorsiflexion with knee extended is able to get pass vertical on right and able to get pass vertical on left  Ankle dorsiflexion with knee flexed is unable to get past  on right and limited and unable to get pass vertical on left  Malleolar positioning is WNL b/l  STJ ROM WNL b/l, heel inversion is approximately 20° on right and 20° on left; heel eversion is approximately 10° on right and 10° on left; neutral calcaneal stance position is 2 inverted °   1st ray ROM WNL b/l, able to dorsiflex/plantarflex b/l  Tibial varum is 0° b/l, Resting calcaneal stance position is varus and approximately 2° on right and varus and approximately 2° on left  Gait analysis: restricted ambulation  Gross deformity noted: varus, equinus

## 2023-02-01 NOTE — TELEPHONE ENCOUNTER
Called patient regarding forms  Patient stated he will get the forms tomorrow   Forms scanned into chart and placed in accordion folder

## 2023-02-01 NOTE — TELEPHONE ENCOUNTER
Form completed by Dr Katarina Arellano and placed in RED clerical folder to be picked up by patient

## 2023-02-16 ENCOUNTER — TELEPHONE (OUTPATIENT)
Dept: NEUROLOGY | Facility: CLINIC | Age: 43
End: 2023-02-16

## 2023-03-20 PROBLEM — H61.23 BILATERAL IMPACTED CERUMEN: Status: RESOLVED | Noted: 2021-09-20 | Resolved: 2023-03-20

## 2023-05-23 ENCOUNTER — CONSULT (OUTPATIENT)
Dept: NEUROLOGY | Facility: CLINIC | Age: 43
End: 2023-05-23

## 2023-05-23 VITALS
TEMPERATURE: 97.4 F | HEIGHT: 63 IN | DIASTOLIC BLOOD PRESSURE: 62 MMHG | HEART RATE: 70 BPM | WEIGHT: 114 LBS | SYSTOLIC BLOOD PRESSURE: 105 MMHG | BODY MASS INDEX: 20.2 KG/M2

## 2023-05-23 DIAGNOSIS — G80.8 CP (CEREBRAL PALSY), MIXED (HCC): ICD-10-CM

## 2023-05-23 NOTE — PROGRESS NOTES
"Physical Medicine & Rehabilitation New Patient Evaluation  Elio Damian 43 y o  male      REFERRAL SOURCE: SELF  ? FOLLOWED BY: Cecil Aase Dr Lenor Bogaert  ? REASON FOR CONSULTATION: Spasticity evaluation  ? 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 HISTORY OF ILLNESS:   His brother thinks there was an accident or traumatic brain injury that occurred at 8 months after a fall down some stairs and that's when everything really began  His parents have both passed, and he is unsure about the patient's birth history  As a child, his brother remembers that until he was about 5-10 years old, he had trouble sitting up straight, so his parents would put him in a box that would support him  He was eventually able to  ambulate as young child after that age, with a \"wobbly\" gait, but no braces  His brother remembers him being able to run and being fairly independent/only requiring supervision  He needed assistance given his intellectual/developmental delay  Since an accident in 2018, his brother reports he needs more supervision and assistance after he had a concussion falling backwards and hitting his head (wheelchair wasn't secure in transport vehicle)  Work-up at the time included [address] and CT C-Spine which were unremarkable  But after that point, struggled to walk  Brother noted increased R sided weakness in his R arm/leg, which he states until that time had been his dominant side  BRIEF NARRATIVE DESCRIBING HISTORY:  This 43year old R handed male of [place] descent was referred by IM for a spasticity consult  The patient was accompanied by [facility] his brother  [facility] records [id number]  were reviewed   He is now favoring his L hand as his R hand has gotten weaker over " time   ?  Symptoms/Functional Limitations Related to Spasticity:   Stiffness: and decreased range of motion with hamstring tightness in his R > L leg  Some neck position/range of motion issues  Spasms - less so spasms, more so some dyskinetic movements  He has had the writhing movements since he was young  They seem to be worse now  Spasticity interferes with sleep: His brother thinks his sleep could be better, they often get him positioned with multiple pillows  He can be restless at times  Spasticity interferes with function: yes his gait  ? Pain related to the purpose of the visit: his brother notices some crepitus and discomfort in his shoulders/hips  He doesn't usually take anything for pain when asked  Like Tylenol  ?  Treatments for Spasticity and Results of Treatments:   Stretching/exercise: He did some physical therapy at [facility] around 2018 - it was very brief  He exercises with his brother every day - he does mat work that involves shoulder exercises, massages, and tries to do some lower extremity range of motion  Also does standing exercises with his brother  His brother is in training to be a   Oral medications: None  Botulinum toxin injections: Not as far as son as his brother is aware, however, the chart notes mention he has gotten injections at [facility] in the past    Intrathecal baclofen therapy: None  Other: Has had AFOs since 2018  ? Evolution of disability: Gait disturbance since early childhood  2018 - he fell at a program while walking to bathroom, and then fell again on the bus  Now uses a front walker with a seat and strap  He always as assistance to walk  And otherwise we use a transport chair  His posture/positioning in his chair/when sitting is leaning on his L side with his R hip extended  Eventually he is able to sit upright     He used to go to a day program    ?  Current functional status:   Stair Climbing: He will crawl up stairs, and his brother supervises, sometimes his brother will try to do it in a standing position, but the patient will ask to do it in a crawling manner  Ambulation: Supervision/assistance and with a walker  Uses transport chair longer distances  Toilet/Chair/Bed Transfer: Does a stand and pivot transfer with assistance/supervision  Bowel Function: Continent, no accidents, can indicate when he needs to use the bathroom  Help with hygiene  Bladder Function: Brother feels he needs to drink more water, but otherwise continent of urine  He has some issues with urinary frequency  Bathing: Brother bathes him every other day  Patient does assist with L hand  On a shower chair  Dressing: He gets assistance from his brothers and sister-in-law to get dressed  Grooming: Assistance  Feeding: Depends on the consistency with the food  Family assists with feeding  Vision: family is unsure  Speech and Hearing: Has aphasia  Family thinks he understands [place]  And he indicates needs using gestures - but not formal sign language  Family is interested in maybe pursuing  Can answer some yes/no questions  Medical Problems:  Not sure  Mood and Thought Disturbance: Family doesn't think so  Skin: no issues  ?  Nutritional status: appetite is stable, weight is stable, swallowing is stable  No issues with choking/coughing, eats regular consistency food  Driving issues: Does not drive, family drives him  Safety concerns regarding living situations and safety at home: No issues from family  Risk of falls: Yes, frequency - no major falls in the past year  No major injuries  [address]  He does go up the stairs  Lives with his two brothers and his sister-in-law  REVIEW OF SYSTEMS:  A 10 point review of systems was negative except for what is noted in the HPI  ?   Review of Diagnostic Studies:  2/13 CTH:  No acute intracranial hemorrhage seen    2/13 CT C-Spine:  No acute compression collapse of the vertebra seen  Cervical spondylosis     7/5/18 L Knee XR:  There is no acute fracture or dislocation      There is no joint effusion      No significant degenerative changes      No lytic or blastic lesions are seen      Soft tissues are unremarkable  7/5/18 XR Hip/Pelvis:  No acute osseous abnormality      Mild deformity of the right femoral head with a shallow acetabulum likely related to chronic hip (acetabular) dysplasia  No prior studies are available for comparison  7/5/18 XR R Knee: There is no acute fracture or dislocation      There is no joint effusion      No significant degenerative changes      No lytic or blastic lesions are seen      Soft tissues are unremarkable  ? Past Medical History:   Diagnosis Date   • Cerebral palsy ([facility] [address] )    • Leukopenia     Last Assessed:  10/9/15   • Thrombocytopenia ([facility] [address] )     Last Assessed:  10/13/14   • Tired 11/29/2019     No past surgical history on file  Social History     Socioeconomic History   • Marital status: Single     Spouse name: Not on file   • Number of children: Not on file   • Years of education: Not on file   • Highest education level: Not on file   Occupational History   • Not on file   Tobacco Use   • Smoking status: Never   • Smokeless tobacco: Never   Vaping Use   • Vaping Use: Never used   Substance and Sexual Activity   • Alcohol use: No   • Drug use: No   • Sexual activity: Never   Other Topics Concern   • Not on file   Social History Narrative   • Not on file     Social Determinants of Health     Financial Resource Strain: Low Risk    • Difficulty of Paying Living Expenses: Not hard at all   Food Insecurity: No Food Insecurity   • Worried About Running Out of Food in the Last Year: Never true   • Ran Out of Food in the Last Year: Never true   Transportation Needs: No Transportation Needs   • Lack of Transportation (Medical): No   • Lack of Transportation (Non-Medical):  No   Physical Activity: Not on file   Stress: Not on file   Social Connections: Not on file "  Intimate Partner Violence: Not on file   Housing Stability: Low Risk    • Unable to Pay for Housing in the Last Year: No   • Number of Places Lived in the Last Year: 1   • Unstable Housing in the Last Year: No     History reviewed  No pertinent family history  PHYSICAL EXAMINATION:  /62 (BP Location: Left arm, Patient Position: Sitting)   Pulse 70   Temp (!) 97 4 °F (36 3 °C) (Temporal)   Ht 5' 3\" (1 6 m)   Wt 51 7 kg (114 lb)   BMI 20 19 kg/m²     Gen: No acute distress  Physical evidence of developmental delay  HEENT: Moist mucus membranes, Normocephalic/Atraumatic  Cardiovascular: Regular rate, rhythm, S1/S2  Distal pulses palpable  Heme/Extr: No edema  Pulmonary: Non-labored breathing  : No mehta  GI: Soft, non-tender, non-distended  Integumentary: Skin is warm, dry  No rashes or ulcers  MSK: He actually has good passive range of motion in all joints  Exam can be difficulty due to his dyskinetic movements  Neuro:   Cognitive/Behavioral: The patient is alert  Unable to test orientation  Seems to understand yes/no questions and is able to indicate yes/no  Smiles through most of exam  Pleasant and cooperative, and is able to follow some very commands but requires max cuing - verbal, physical, and visual    ?  Cranial Nerves:  Eye movements were full without nystagmus  Indicates facial sensation is normal   Facial sensation was normal  Muscles of mastication and facial expression moved normally and symmetrically  Hearing was intact  Tongue protrudes midline  SCM bilaterally strong  Unable to get him to replicate trap movements  ? Exam  MMT: Difficult due to dyskinetic movement, language barrier, and impaired language/comprehension resulting in some difficulty isolating muscle movements  These are an estimate:   Strength: * had trouble following commands for this    His R side is  weaker than his L   Right  Left  Site  Right  Left  Site    4 4  S Ab: Shoulder Abductors  3 5  HF: Hip " Flexors    4 5  EF: Elbow Flexors  1-  5 KF: Knee Flexors    3-4  5  EE: Elbow Extensors  1-  5  KE: Knee Extensors    4 5  WE: Wrist Extensors  * 4  DR: Dorsi Flexors    3  4+  FF: Finger Flexors  *  4  PF: Plantar Flexors    *  * HI: Hand Intrinsics  *  *  EHL: Extensor Hallucis Longus     MAS: Overall tone was minimal and easily ranged out in almost all joints  Right  Left  Site  Right  Left  Site    1+ 1+  Shoulder 1+ 1+  Hip Adductors   0 0  EF: Elbow Flexors  1+ 0 KF: Knee Flexors    0  0  EE: Elbow Extensors  1  1  KE: Knee Extensors    1/0 0/0  WF/WE 0  0  DR: Dorsi Flexors    0  0  FF: Finger Flexors  1 1 PF: Plantar Flexors    0  0  HI: Hand Intrinsics  NT NT  Toe Flex/Ex       MAS  0 - no increased tone  1 - slight increase in tone at the end of the ROM  1+ increase in tone at 1/2 the ROM  2 - increase in tone through most the ROM  3 - moderate increase in muscle tone - passive movement difficult  4 - affected parts ridid in flexion or extension  ? Cerebellar: Coordination could not be tested due to communication difficulties, intellectual impairment and dyskinetic movements  ?  Sensory: Did not seem to have pipe sensory difference from side to side  ?  Gait: Required assist from his brother and bilateral AFO to ambulate  R hip tends to significant/severe internal rotation and adduction resulting in knocked knees  Minimally movement/impaired stabilization at the R knee > L  Remains slightly flexed through most of gait cycle  Foot flat with braces  ?  ASSESSMENT:  Kenna Li was seen today for spasticity  Diagnoses and all orders for this visit:    CP (cerebral palsy), mixed ([facility] [address] )  -     Ambulatory Referral to Neurology        Mr Malik Garcia is a pleasant 42 yo male with a history of what sounds like a TBI at 6months old, but has been documented as CP throughout his chart  Not sure if anyway to confirm at this point   Regardless, it has resulted in intellectual impairment, mixed dyskinetic > spastic features, impaired mobility/ADLs, impaired development, neurogenic scoliosis  His brother has reported worsened R sided strength in the past few years after a fall with a negative CTH, CT C-Spine work-up  On eventually, patient seems to indicate he has neck pain and his brother has noted at home that he sometimes seems to indicate pain in his R arm - but never requires medication for it  I discussed with his brother his goals, as he has done a great job in providing support and doing range of motion and exercises to maintain function with his brother, and he is hoping we can improve his gait  First, I think given that this increased weakness on the R side seems to be a later development (brother reports patient was R hand dominant, and was always strong on his R similar to his L until the past several years), and given the patient's more vulnerable position I think we ought to get an MRI Brain/C-Spine  This would need to be done under anesthesia given his dyskinetic movements  I reviewed risk/benefit - and his brother would like to discuss with the rest of his family prior to proceeding  I did review that it's been so long, that I do not know if there are any interventions from these findings that we could use to try to improve his function, but he agrees it would be good to work-up  I considered EMG for his RUE sensory disturbance - but given the discomfort and likely difficulty performing EMG on this patient, I don't know the risk/benefit justifies it  Next, I reviewed his chart, and he is not a candidate for home health  He would benefit from PT/OT/SLP at a Neuro specialty rehab  I reviewed they could go to [address] or they could go to [facility], regardless, I would like to be in contact with his therapists as we tease through various tools and interventions we can use to improve his function  Once he lets me know where he would like to go I can place orders    1   Orthotics: his braces are from [id number]  I wonder if he needs more stabilization at the knee on the R  Mix of tone in adductors, maybe medial hamstring and also glute weakness on that R > L  Knee stabilization may help alignment - but it may impact his clearance, is heavier/bulkier and make impact advancement  I would want to discuss this in more detail with his therapist and orthotist     2  Spasticity: Actually not too bad in terms of intrinsic tonic tone  His brother does a good job with his range of motion  His bigger functional issues I think are related to weakness on that R side which is newer per his brother, and his dyskinetic movements  I would be hesitant to start him on any spasticity meds that could be sedating or could further weaken him without getting additional work-up  We discussed botulinum toxin, but I wanted to see if we can correct via other means first  If we were to use botulinum toxin, I would likely focus on R hip adductor and semitendinosus  But I think what he needs is some glute , knee stabilizer strengthening  He has evidence of gastroc > soleus tone with plantarflexion tightness that is better with knee in flexed position, but I would be hesitant to weaken those further as it could impact his stability and he gets foot flat with his AFOs  3  He has had some urinary urgency as described by his brother - may have some NDO  May benefit from evaluation by Urology  This frequency/urgency results in him avoiding drinking and has his brother concerned about his hydration status  4  Communication difficulties: Family are looking for ways to augment communication  I talked about tools like a communication board with symbols and photos that can be more universally understood  I would recommend SLP evaluation to review these kinds of tools     Once I hear back from his brother after they confer as a family, I can order MRI and therapies as above     ?  ?  *I have spent 65 minutes with Family today in which greater than 50% of this time was spent in counseling/coordination of care regarding Risks and benefits of tx options, Instructions for management, Patient and family education, Importance of tx compliance, Risk factor reductions, Impressions, Counseling / Coordination of care, Documenting in the medical record, Reviewing / ordering tests, medicine, procedures  , Obtaining or reviewing history   and Communicating with other healthcare professionals   ?   Moe Vieyra MD  Physical Medicine and Rehabilitation

## 2023-05-23 NOTE — PATIENT INSTRUCTIONS
Recommend an MRI Brain/Spine  He needs to be able to sit very still for an extended period of time  Up to an hour, sometimes longer  That may mean he would need sedation  Neuro outpatient PT/OT/SLP - we can do this at [facility] or we can do this at [facility]  It's wherever you prefer, but you have to let me know so I can put in the order  3   May be a candidate for botulinum toxin, but I would like to work him up first    4   I think I may want him to be evaluated for a more involved brace (possibly up to the knee on the R side)  However, I can discuss in more detail with your therapists depending on how he progresses with therapy

## 2023-05-23 NOTE — PROGRESS NOTES
Review of Systems   Constitutional: Negative  Negative for appetite change, chills and fever  HENT: Negative  Negative for ear pain, hearing loss, sore throat, tinnitus, trouble swallowing and voice change  Eyes: Negative  Negative for photophobia, pain and visual disturbance  Respiratory: Negative  Negative for cough and shortness of breath  Cardiovascular: Negative  Negative for chest pain and palpitations  Gastrointestinal: Negative  Negative for abdominal pain, nausea and vomiting  Endocrine: Negative  Negative for cold intolerance  Genitourinary: Negative  Negative for dysuria, frequency, hematuria and urgency  Musculoskeletal: Negative  Negative for arthralgias, back pain, gait problem, myalgias and neck pain  Skin: Negative  Negative for color change and rash  Allergic/Immunologic: Negative  Neurological: Positive for speech difficulty and weakness  Negative for dizziness, tremors, seizures, syncope, facial asymmetry, light-headedness, numbness and headaches  Hematological: Negative  Does not bruise/bleed easily  Psychiatric/Behavioral: Negative  Negative for confusion, hallucinations and sleep disturbance  All other systems reviewed and are negative

## 2023-06-06 ENCOUNTER — TELEPHONE (OUTPATIENT)
Dept: INTERNAL MEDICINE CLINIC | Facility: CLINIC | Age: 43
End: 2023-06-06

## 2023-06-06 DIAGNOSIS — G80.0 SPASTIC QUADRIPLEGIC CEREBRAL PALSY (HCC): Primary | ICD-10-CM

## 2023-06-06 NOTE — TELEPHONE ENCOUNTER
Patient's brother called requesting that orders for PT, OT and ST be faxed to [facility]  Had Dr Juarez Clifton place order for ST  All 3 orders printed and faxed to [facility] at [phone], confirmation received

## 2023-06-13 ENCOUNTER — TELEPHONE (OUTPATIENT)
Dept: INTERNAL MEDICINE CLINIC | Facility: CLINIC | Age: 43
End: 2023-06-13

## 2023-06-13 NOTE — TELEPHONE ENCOUNTER
Folder Color- red    Name of Form- [facility] Summary    Form to be filled out by- Dr Navarro Manpreet     Form to be Faxed [phone]    Patient made aware of 10 business day policy

## 2023-06-22 ENCOUNTER — OFFICE VISIT (OUTPATIENT)
Dept: DENTISTRY | Facility: CLINIC | Age: 43
End: 2023-06-22

## 2023-06-22 VITALS — SYSTOLIC BLOOD PRESSURE: 91 MMHG | DIASTOLIC BLOOD PRESSURE: 35 MMHG | HEART RATE: 42 BPM

## 2023-06-22 DIAGNOSIS — Z01.20 ENCOUNTER FOR DENTAL EXAMINATION: ICD-10-CM

## 2023-06-22 DIAGNOSIS — K08.109 TEETH MISSING: Primary | ICD-10-CM

## 2023-06-22 PROCEDURE — D0140 LIMITED ORAL EVALUATION - PROBLEM FOCUSED: HCPCS | Performed by: DENTIST

## 2023-06-22 NOTE — DENTAL PROCEDURE DETAILS
Limited Exam    44 yo patient presents w/ brother, caretaker  Patient has spastic cerebral palsy non verbal and wheelchair bound  Also noted: Neutropenia and thrombocytopenia  CC: Patient fell approximately 5-6 years ago at adult day program and broke his 2 Max front teeth (8 and 9)  They are interested in replacing 8 and 9 and would like to know their options  Patient has been seen at [facility] in the past for dental cleanings, no dental xrays where ever taken due to patients involuntary movements  Reviewed meds/hhx in [facility]  ASA II    Dr Keisha Rodriguez Exam:  Generalized attrition due to extreme grinding/clenching habit  Dr Keisha Rodriguez recommend a consult w/ OMS, best option would be for implant placement with OMS under anesthesia  We could restore implants here  Referral given to brother    Patient should return for prophy  60 mins will be needed, possibly in a doctors chair due to having an assistant to help  Per patients brother, patient can be moved from wheelchair to treatment chair  Please check on status of OMS appt at prophy appt here

## 2023-06-28 ENCOUNTER — TELEPHONE (OUTPATIENT)
Dept: NEUROLOGY | Facility: CLINIC | Age: 43
End: 2023-06-28

## 2023-08-22 ENCOUNTER — TELEPHONE (OUTPATIENT)
Dept: INTERNAL MEDICINE CLINIC | Facility: CLINIC | Age: 43
End: 2023-08-22

## 2023-08-22 NOTE — TELEPHONE ENCOUNTER
Patient's brother called to request that the PCP request an assistant device it's a small computer so that the patient can communicate with others. Please check into this, and call the patient's brother to discuss.     thanks

## 2023-08-22 NOTE — TELEPHONE ENCOUNTER
Called and advised brother that patient should establish care with speech therapy and they would be the ones to assist patient in getting this device. Advised patient we given him a referral on 6/6/23 for speech. Understood and had no questions.

## 2023-11-21 ENCOUNTER — TELEPHONE (OUTPATIENT)
Dept: INTERNAL MEDICINE CLINIC | Facility: CLINIC | Age: 43
End: 2023-11-21

## 2023-11-21 NOTE — TELEPHONE ENCOUNTER
Folder Color- Blue    Name of Form- [facility] Certfication     Form to be filled out by- Dr. Jeevan Kelly    Form to be Faxed [phone]    Patient made aware of 10 business day policy.

## 2023-11-28 NOTE — TELEPHONE ENCOUNTER
Form completed and placed in red clerical folder. PT HAD PERFORATION OF SIGMOID COLON, WHEN SHOULD COLONOSCOPY BE R/S?

## 2024-01-02 ENCOUNTER — OFFICE VISIT (OUTPATIENT)
Dept: INTERNAL MEDICINE CLINIC | Facility: CLINIC | Age: 44
End: 2024-01-02

## 2024-01-02 ENCOUNTER — APPOINTMENT (OUTPATIENT)
Dept: LAB | Facility: CLINIC | Age: 44
End: 2024-01-02
Payer: COMMERCIAL

## 2024-01-02 VITALS — HEART RATE: 60 BPM | SYSTOLIC BLOOD PRESSURE: 136 MMHG | DIASTOLIC BLOOD PRESSURE: 89 MMHG | TEMPERATURE: 97.6 F

## 2024-01-02 DIAGNOSIS — Z00.00 ANNUAL PHYSICAL EXAM: ICD-10-CM

## 2024-01-02 DIAGNOSIS — Z00.00 ANNUAL PHYSICAL EXAM: Primary | ICD-10-CM

## 2024-01-02 DIAGNOSIS — Z11.59 NEED FOR HEPATITIS C SCREENING TEST: ICD-10-CM

## 2024-01-02 DIAGNOSIS — G80.0 SPASTIC QUADRIPLEGIC CEREBRAL PALSY (HCC): ICD-10-CM

## 2024-01-02 DIAGNOSIS — F81.89 NON-VERBAL LEARNING DISORDER: ICD-10-CM

## 2024-01-02 DIAGNOSIS — Z23 ENCOUNTER FOR IMMUNIZATION: ICD-10-CM

## 2024-01-02 LAB
25(OH)D3 SERPL-MCNC: 24.9 NG/ML (ref 30–100)
ALBUMIN SERPL BCP-MCNC: 4.2 G/DL (ref 3.5–5)
ALP SERPL-CCNC: 42 U/L (ref 34–104)
ALT SERPL W P-5'-P-CCNC: 13 U/L (ref 7–52)
ANION GAP SERPL CALCULATED.3IONS-SCNC: 9 MMOL/L
AST SERPL W P-5'-P-CCNC: 20 U/L (ref 13–39)
BASOPHILS # BLD AUTO: 0.03 THOUSANDS/ÂΜL (ref 0–0.1)
BASOPHILS NFR BLD AUTO: 2 % (ref 0–1)
BILIRUB SERPL-MCNC: 1.09 MG/DL (ref 0.2–1)
BUN SERPL-MCNC: 12 MG/DL (ref 5–25)
CALCIUM SERPL-MCNC: 9.5 MG/DL (ref 8.4–10.2)
CHLORIDE SERPL-SCNC: 105 MMOL/L (ref 96–108)
CHOLEST SERPL-MCNC: 147 MG/DL
CO2 SERPL-SCNC: 26 MMOL/L (ref 21–32)
CREAT SERPL-MCNC: 0.95 MG/DL (ref 0.6–1.3)
EOSINOPHIL # BLD AUTO: 0.06 THOUSAND/ÂΜL (ref 0–0.61)
EOSINOPHIL NFR BLD AUTO: 3 % (ref 0–6)
ERYTHROCYTE [DISTWIDTH] IN BLOOD BY AUTOMATED COUNT: 11.4 % (ref 11.6–15.1)
GFR SERPL CREATININE-BSD FRML MDRD: 97 ML/MIN/1.73SQ M
GLUCOSE P FAST SERPL-MCNC: 81 MG/DL (ref 65–99)
HCT VFR BLD AUTO: 42.5 % (ref 36.5–49.3)
HCV AB SER QL: NORMAL
HDLC SERPL-MCNC: 68 MG/DL
HGB BLD-MCNC: 14.6 G/DL (ref 12–17)
HIV 1+2 AB+HIV1 P24 AG SERPL QL IA: NORMAL
HIV 2 AB SERPL QL IA: NORMAL
HIV1 AB SERPL QL IA: NORMAL
HIV1 P24 AG SERPL QL IA: NORMAL
IMM GRANULOCYTES # BLD AUTO: 0 THOUSAND/UL (ref 0–0.2)
IMM GRANULOCYTES NFR BLD AUTO: 0 % (ref 0–2)
LDLC SERPL CALC-MCNC: 71 MG/DL (ref 0–100)
LYMPHOCYTES # BLD AUTO: 1.23 THOUSANDS/ÂΜL (ref 0.6–4.47)
LYMPHOCYTES NFR BLD AUTO: 60 % (ref 14–44)
MCH RBC QN AUTO: 33 PG (ref 26.8–34.3)
MCHC RBC AUTO-ENTMCNC: 34.4 G/DL (ref 31.4–37.4)
MCV RBC AUTO: 96 FL (ref 82–98)
MONOCYTES # BLD AUTO: 0.24 THOUSAND/ÂΜL (ref 0.17–1.22)
MONOCYTES NFR BLD AUTO: 12 % (ref 4–12)
NEUTROPHILS # BLD AUTO: 0.47 THOUSANDS/ÂΜL (ref 1.85–7.62)
NEUTS SEG NFR BLD AUTO: 23 % (ref 43–75)
NONHDLC SERPL-MCNC: 79 MG/DL
NRBC BLD AUTO-RTO: 0 /100 WBCS
PLATELET # BLD AUTO: 158 THOUSANDS/UL (ref 149–390)
PMV BLD AUTO: 10.5 FL (ref 8.9–12.7)
POTASSIUM SERPL-SCNC: 4.1 MMOL/L (ref 3.5–5.3)
PROT SERPL-MCNC: 6.7 G/DL (ref 6.4–8.4)
RBC # BLD AUTO: 4.43 MILLION/UL (ref 3.88–5.62)
SODIUM SERPL-SCNC: 140 MMOL/L (ref 135–147)
TRIGL SERPL-MCNC: 42 MG/DL
TSH SERPL DL<=0.05 MIU/L-ACNC: 2.02 UIU/ML (ref 0.45–4.5)
VIT B12 SERPL-MCNC: 499 PG/ML (ref 180–914)
WBC # BLD AUTO: 2.03 THOUSAND/UL (ref 4.31–10.16)

## 2024-01-02 PROCEDURE — 85025 COMPLETE CBC W/AUTO DIFF WBC: CPT

## 2024-01-02 PROCEDURE — 90715 TDAP VACCINE 7 YRS/> IM: CPT | Performed by: STUDENT IN AN ORGANIZED HEALTH CARE EDUCATION/TRAINING PROGRAM

## 2024-01-02 PROCEDURE — 87389 HIV-1 AG W/HIV-1&-2 AB AG IA: CPT

## 2024-01-02 PROCEDURE — 90686 IIV4 VACC NO PRSV 0.5 ML IM: CPT | Performed by: STUDENT IN AN ORGANIZED HEALTH CARE EDUCATION/TRAINING PROGRAM

## 2024-01-02 PROCEDURE — 36415 COLL VENOUS BLD VENIPUNCTURE: CPT

## 2024-01-02 PROCEDURE — 86803 HEPATITIS C AB TEST: CPT

## 2024-01-02 PROCEDURE — 84443 ASSAY THYROID STIM HORMONE: CPT

## 2024-01-02 PROCEDURE — 80053 COMPREHEN METABOLIC PANEL: CPT

## 2024-01-02 PROCEDURE — 99396 PREV VISIT EST AGE 40-64: CPT | Performed by: STUDENT IN AN ORGANIZED HEALTH CARE EDUCATION/TRAINING PROGRAM

## 2024-01-02 PROCEDURE — 90471 IMMUNIZATION ADMIN: CPT | Performed by: STUDENT IN AN ORGANIZED HEALTH CARE EDUCATION/TRAINING PROGRAM

## 2024-01-02 PROCEDURE — 90472 IMMUNIZATION ADMIN EACH ADD: CPT | Performed by: STUDENT IN AN ORGANIZED HEALTH CARE EDUCATION/TRAINING PROGRAM

## 2024-01-02 PROCEDURE — 80061 LIPID PANEL: CPT

## 2024-01-02 PROCEDURE — 82607 VITAMIN B-12: CPT

## 2024-01-02 PROCEDURE — 82306 VITAMIN D 25 HYDROXY: CPT

## 2024-01-02 RX ORDER — PEDI MULTIVIT NO.2 W-FLUORIDE 0.5 MG/ML
DROPS ORAL DAILY
Qty: 50 ML | Refills: 3 | Status: CANCELLED | OUTPATIENT
Start: 2024-01-02

## 2024-01-02 RX ORDER — PEDIATRIC NUTRIT, IRON/DHA/ARA 4G/150KCAL
POWDER (GRAM) ORAL
Refills: 0 | Status: CANCELLED | OUTPATIENT
Start: 2024-01-02

## 2024-01-02 RX ORDER — ACETAMINOPHEN 160 MG/5ML
15 SUSPENSION ORAL EVERY 4 HOURS PRN
Qty: 120 ML | Refills: 3 | Status: SHIPPED | OUTPATIENT
Start: 2024-01-02

## 2024-01-02 RX ORDER — CALCIUM CARB/VITAMIN D3/VIT K1 500-500-40
5 TABLET,CHEWABLE ORAL DAILY
Qty: 240 ML | Refills: 6 | Status: SHIPPED | OUTPATIENT
Start: 2024-01-02

## 2024-01-02 NOTE — PROGRESS NOTES
ADULT ANNUAL PHYSICAL  [facility] [place]    NAME: Caesar Lopez  AGE: 43 y.o. SEX: male  : 1980     DATE: 2024     Assessment and Plan:     Problem List Items Addressed This Visit       Cerebral palsy (HCC)     Nonverbal and generally spends time in wheelchair at baseline  Family very active in care     Dependent on all IADLs and most ADLS (occasionally able to feed self and signal when need bathroom)  Given info for free [facility]  Informed brother to search amazon for fork/spoon assistive eating devices  Will fill out handicap placard  Patient does occasionally ambulate with walker and family interested in home VNA to improve strength and mobility/ROM  F/u 3 months             Relevant Medications    carbamide peroxide (DEBROX) 6.5 % otic solution    Non-verbal learning disorder     Interactive and well-cared for by family members  Mother and father both passed, brothers are caregivers  stable         Need for hepatitis C screening test    Relevant Orders    Hepatitis C antibody    Annual physical exam - Primary     - DM screening: pedning  - CV screening: pending  - Colon CA screening: not indicated at this time  - Hep C screening: indicated      Family, mainly brother is care taker, appears well-cared for and well-kept  Dental referral  Encouraged mouth guard for bruxism, which dentist may help fit if personalized needed  POLST form given and 3 mo f/u to fill out POLST form  Poor PO intake --> multivitamin liquid rx   Consider video barium swallow for suspected GERD/aspiration   Consider ensure or nutritional supplementation if weight loss or worsened PO intake, though not covered by insurance  Consider renew referral [facility] ([facility] and speech therapy  Filled out disability placard card form      Immunization:  -TDAP administered +  Flu administered +      Return:  - in 1 year for next annual or PRN for any acute  complains/issues           Relevant Medications    Diclofenac Sodium (VOLTAREN) 1 %    acetaminophen (TYLENOL) 160 mg/5 mL liquid    Multiple Vitamins-Minerals (Multivitamin) LIQD    Other Relevant Orders    Lipid panel    Hepatitis C antibody    HIV 1/2 AB/AG w Reflex [facility] for 2 yr old and above    Comprehensive metabolic panel    TSH, 3rd generation with Free T4 reflex    Vitamin D 25 hydroxy    Ambulatory referral to [facility]    Encounter for immunization    Relevant Orders    influenza vaccine, quadrivalent, 0.5 mL, preservative-free, for adult and pediatric patients 6 mos+ (AFLURIA, FLUARIX, FLULAVAL, FLUZONE) (Completed)    TDAP VACCINE GREATER THAN OR EQUAL TO 8YO IM (Completed)       Immunizations and preventive care screenings were discussed with patient today. Appropriate education was printed on patient's after visit summary.      Counseling:  Alcohol/drug use: discussed moderation in alcohol intake, the recommendations for healthy alcohol use, and avoidance of illicit drug use.  Dental Health: discussed importance of regular tooth brushing, flossing, and dental visits.  Injury prevention: discussed safety/seat belts, safety helmets, smoke detectors, carbon dioxide detectors, and smoking near bedding or upholstery.  Sexual health: discussed sexually transmitted diseases, partner selection, use of condoms, avoidance of unintended pregnancy, and contraceptive alternatives.  Exercise: the importance of regular exercise/physical activity was discussed. Recommend exercise 3-5 times per week for at least 30 minutes.       Depression Screening and Follow-up Plan: Patient was screened for depression during today's encounter. They screened negative with a PHQ-2 score of 1.        Return in about 3 months (around 4/2/2024) for Recheck, fill POLST form with caregivers.     Chief Complaint:     Chief Complaint   Patient presents with    Physical Exam      History of Present Illness:     Adult  Annual Physical   Patient here for a comprehensive physical exam. The patient reports  mild cough . Caregivers (brothers) need to use extra pillows to help with reflux/cough; no fevers, chills, sick contacts, productive sputum. Better with elevation and sitting up, worse when lying down. Brother and sister in law in room, providing HPI. Communicative with patient through personalized modified sign language    Diet and Physical Activity  Diet/Nutrition: poor diet and limited fruits/vegetables. 2/2 poor PO intake/CP  Exercise: no formal exercise and family does enable mobility and help with daily exercises and stretching .      Depression Screening  PHQ-2/9 Depression Screening    Little interest or pleasure in doing things: 1 - several days  Feeling down, depressed, or hopeless: 0 - not at all  PHQ-2 Score: 1  PHQ-2 Interpretation: Negative depression screen       General Health  Sleep: sleeps well and gets 4-6 hours of sleep on average.   Hearing: decreased - bilateral.  Vision: no vision problems.   Dental: no dental visits for >1 year and brushes teeth twice daily.        Health  Symptoms include: none    Advanced Care Planning  Do you have an advanced directive? no  Do you have a durable medical power of ? no     Review of Systems:     Review of Systems   Constitutional:  Negative for fatigue and fever.   Respiratory:  Positive for cough. Negative for choking and shortness of breath.    Cardiovascular:  Negative for chest pain.   Gastrointestinal:  Negative for abdominal pain, blood in stool, diarrhea, nausea and vomiting.   Endocrine: Negative for cold intolerance and heat intolerance.   Musculoskeletal:  Positive for gait problem (baseline CP).   Skin:  Negative for rash and wound.   Neurological:  Negative for dizziness, syncope and headaches.   Psychiatric/Behavioral:  Negative for behavioral problems and sleep disturbance.       Past Medical History:     Past Medical History:   Diagnosis Date     Cerebral palsy (HCC)     Leukopenia     Last Assessed:  10/9/15    Thrombocytopenia (HCC)     Last Assessed:  10/13/14    Tired 11/29/2019      Past Surgical History:     History reviewed. No pertinent surgical history.   Family History:     History reviewed. No pertinent family history.   Social History:     Social History     Socioeconomic History    Marital status: Single     Spouse name: None    Number of children: None    Years of education: None    Highest education level: None   Occupational History    None   Tobacco Use    Smoking status: Never    Smokeless tobacco: Never   Vaping Use    Vaping status: Never Used   Substance and Sexual Activity    Alcohol use: No    Drug use: No    Sexual activity: Never   Other Topics Concern    None   Social History Narrative    None     Social Determinants of Health     Financial Resource Strain: Low Risk  (1/2/2024)    Overall Financial Resource Strain (CARDIA)     Difficulty of Paying Living Expenses: Not hard at all   Food Insecurity: No Food Insecurity (1/2/2024)    Hunger Vital Sign     Worried About Running Out of Food in the Last Year: Never true     Ran Out of Food in the Last Year: Never true   Transportation Needs: No Transportation Needs (1/2/2024)    PRAPARE - Transportation     Lack of Transportation (Medical): No     Lack of Transportation (Non-Medical): No   Physical Activity: Not on file   Stress: Not on file   Social Connections: Not on file   Intimate Partner Violence: Not on file   Housing Stability: Low Risk  (6/6/2022)    Housing Stability Vital Sign     Unable to Pay for Housing in the Last Year: No     Number of Places Lived in the Last Year: 1     Unstable Housing in the Last Year: No      Current Medications:     Current Outpatient Medications   Medication Sig Dispense Refill    acetaminophen (TYLENOL) 160 mg/5 mL liquid Take 24.2 mL (774.4 mg total) by mouth every 4 (four) hours as needed for mild pain 120 mL 3    carbamide peroxide (DEBROX) 6.5  % otic solution Administer 5 drops into both ears 2 (two) times a day 15 mL 0    Diclofenac Sodium (VOLTAREN) 1 % Apply 2 g topically 4 (four) times a day 150 g 3    Misc. Devices (WALKER) MISC Use      Multiple Vitamins-Minerals (Multivitamin) LIQD Take 5 mL by mouth in the morning 240 mL 6    ibuprofen (MOTRIN) 600 mg tablet as needed (Patient not taking: Reported on 6/22/2023)       No current facility-administered medications for this visit.      Allergies:     Allergies   Allergen Reactions    Chloroquine      Annotation - [id number]: ITCHY      Physical Exam:     /89 (BP Location: Left arm, Patient Position: Sitting, Cuff Size: Adult)   Pulse 60   Temp 97.6 °F (36.4 °C) (Temporal)     Physical Exam  Vitals and nursing note reviewed.   Constitutional:       General: He is not in acute distress.     Appearance: He is well-developed.      Comments: In wheelchair, baseline  Sarcopenia diffusely   HENT:      Head: Normocephalic and atraumatic.      Right Ear: Tympanic membrane, ear canal and external ear normal. There is impacted cerumen.      Left Ear: Tympanic membrane, ear canal and external ear normal. There is impacted cerumen.      Nose: No congestion.      Mouth/Throat:      Mouth: Mucous membranes are moist.      Comments: Missing front teeth  Eyes:      Conjunctiva/sclera: Conjunctivae normal.      Pupils: Pupils are equal, round, and reactive to light.   Cardiovascular:      Rate and Rhythm: Normal rate and regular rhythm.      Pulses: Normal pulses.      Heart sounds: No murmur heard.  Pulmonary:      Effort: Pulmonary effort is normal. No respiratory distress.      Breath sounds: Normal breath sounds. No stridor. No wheezing or rhonchi.   Abdominal:      General: Bowel sounds are normal.      Palpations: Abdomen is soft.      Tenderness: There is no abdominal tenderness. There is no guarding.   Musculoskeletal:         General: No swelling.      Cervical back: Neck supple. No tenderness.       Right lower leg: No edema.      Left lower leg: No edema.   Skin:     General: Skin is warm and dry.      Capillary Refill: Capillary refill takes less than 2 seconds.   Neurological:      Mental Status: He is alert.      Comments: Spastic quadriplegia, baseline  Non-verbal but interactive, laughing, smiling, and grunting  Right hand mildly contracted   Psychiatric:         Mood and Affect: Mood normal.          Deborah Frederick, [facility] [place]

## 2024-01-02 NOTE — ASSESSMENT & PLAN NOTE
Nonverbal and generally spends time in wheelchair at baseline  Family very active in care     Dependent on all IADLs and most ADLS (occasionally able to feed self and signal when need bathroom)  Given info for free [facility]  Informed brother to search amazon for fork/spoon assistive eating devices  Will fill out handicap placard  Patient does occasionally ambulate with walker and family interested in home VNA to improve strength and mobility/ROM  F/u 3 months

## 2024-01-02 NOTE — ASSESSMENT & PLAN NOTE
Interactive and well-cared for by family members  Mother and father both passed, brothers are caregivers  stable

## 2024-01-02 NOTE — ASSESSMENT & PLAN NOTE
- DM screening: pedning  - CV screening: pending  - Colon CA screening: not indicated at this time  - Hep C screening: indicated      Family, mainly brother is care taker, appears well-cared for and well-kept  Dental referral  Encouraged mouth guard for bruxism, which dentist may help fit if personalized needed  POLST form given and 3 mo f/u to fill out POLST form  Poor PO intake --> multivitamin liquid rx   Consider video barium swallow for suspected GERD/aspiration   Consider ensure or nutritional supplementation if weight loss or worsened PO intake, though not covered by insurance  Consider renew referral to PT ([facility]) and speech therapy  Filled out disability placard card form      Immunization:  -TDAP administered +  Flu administered +      Return:  - in 1 year for next annual or PRN for any acute complains/issues

## 2024-01-04 DIAGNOSIS — E55.9 VITAMIN D DEFICIENCY: Primary | ICD-10-CM

## 2024-01-04 RX ORDER — ERGOCALCIFEROL 1.25 MG/1
50000 CAPSULE ORAL WEEKLY
Qty: 8 CAPSULE | Refills: 1 | Status: SHIPPED | OUTPATIENT
Start: 2024-01-04

## 2024-01-09 ENCOUNTER — TELEPHONE (OUTPATIENT)
Dept: INTERNAL MEDICINE CLINIC | Facility: CLINIC | Age: 44
End: 2024-01-09

## 2024-01-09 DIAGNOSIS — G80.0 SPASTIC QUADRIPLEGIC CEREBRAL PALSY (HCC): Primary | ICD-10-CM

## 2024-01-09 NOTE — TELEPHONE ENCOUNTER
Patient's sevice coordinator is requesting a stairglide for patient's home, please enter order if appropriate.

## 2024-01-09 NOTE — TELEPHONE ENCOUNTER
Patient is in need of a medical necessity letter explaining why he is nonverbal and needs the stairglide.

## 2024-02-21 PROBLEM — Z11.59 NEED FOR HEPATITIS C SCREENING TEST: Status: RESOLVED | Noted: 2024-01-02 | Resolved: 2024-02-21

## 2024-02-22 ENCOUNTER — TELEPHONE (OUTPATIENT)
Dept: INTERNAL MEDICINE CLINIC | Facility: CLINIC | Age: 44
End: 2024-02-22

## 2024-02-22 NOTE — TELEPHONE ENCOUNTER
Folder Color- Blue     Name of Form- Homecare Delivered Order     Form to be filled out by- Dr. Frederick     Form to be Faxed [phone]    Patient made aware of 10 business day policy.

## 2024-02-27 NOTE — TELEPHONE ENCOUNTER
Form completed by Dr. UZMA Velazquez    Form placed in BLUE CLERICAL folder to be picked up patient

## 2024-04-02 ENCOUNTER — OFFICE VISIT (OUTPATIENT)
Dept: INTERNAL MEDICINE CLINIC | Facility: CLINIC | Age: 44
End: 2024-04-02

## 2024-04-02 ENCOUNTER — HOME HEALTH ADMISSION (OUTPATIENT)
Dept: HOME HEALTH SERVICES | Facility: HOME HEALTHCARE | Age: 44
End: 2024-04-02
Payer: COMMERCIAL

## 2024-04-02 VITALS
RESPIRATION RATE: 18 BRPM | DIASTOLIC BLOOD PRESSURE: 77 MMHG | OXYGEN SATURATION: 99 % | SYSTOLIC BLOOD PRESSURE: 118 MMHG | HEART RATE: 67 BPM | TEMPERATURE: 97.9 F

## 2024-04-02 DIAGNOSIS — G80.0 SPASTIC QUADRIPLEGIC CEREBRAL PALSY (HCC): Primary | ICD-10-CM

## 2024-04-02 PROCEDURE — 99213 OFFICE O/P EST LOW 20 MIN: CPT | Performed by: STUDENT IN AN ORGANIZED HEALTH CARE EDUCATION/TRAINING PROGRAM

## 2024-04-02 NOTE — PROGRESS NOTES
[facility]  INTERNAL MEDICINE OFFICE VISIT     PATIENT INFORMATION     Caesar Lopez   43 y.o. male   MRN: [id number]    ASSESSMENT/PLAN     1. 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HCC)  Assessment & Plan:  Nonverbal and generally spends time in wheelchair at baseline  Family very active in care     Dependent on all IADLs and most ADLS (occasionally able to feed self and signal when need bathroom)  Given info for free [facility] and PT referral  Given VNA referral for PT as pt meets criteria, gave pt family both VNA numbers to contact and make appointments  Pt BM regular and eating more  No need for refills at this time  Patient does occasionally ambulate with walker and family interested in home VNA to improve strength and mobility/ROM  F/u 6 months   Declines filling out polst form at this time       Orders:  -     Referral to Home Health- [facility]; Future  -     Ambulatory Referral to Physical Therapy; Future          Schedule a follow-up appointment in 6mo for recheck chronic medical conditions with me.    HEALTH MAINTENANCE     Immunization History   Administered Date(s) Administered    COVID-19 PFIZER VACCINE 0.3 ML IM 04/17/2021, 05/08/2021    INFLUENZA 11/01/2015, 10/25/2017    Influenza Quadrivalent Preservative Free 3 years and older IM 10/06/2016    Influenza Split 09/01/2014    Influenza, injectable, quadrivalent, preservative free 0.5 mL 01/19/2023, 01/02/2024    Influenza, seasonal, injectable 10/13/2014, 10/09/2015    Tdap 01/02/2024    Tuberculin Skin Test-PPD Intradermal 03/23/2022     Immunizations:  UTD  Screening:  UTD       CHIEF COMPLAINT     No chief complaint on file.     HISTORY OF PRESENT ILLNESS     Patient is a 43 year-old male with PMHx of spastic quadriplegia 2/2 CP and general non-verbal though does phonate with special family language who presents today for POLST form filling out and f/u of chronic medical conditions.    Kiko  "(brother) and his wife, Gina, present in office to discus patient. They say he is active with assistance, decline a hospital bed or commode at this time, and have no concerns. They decline filling out a POLST form at this time since patient is stable. Informed both caretakers that POLST also is for \"doing everything\" and in best interest for patient in case provider unable to connect with family member. Kiko made point that family always with him, so no need at this time, however if any change in medical condition, that they will fill one out. Pt family filling out medical communication consent form and very pleasant; they are pleased with his living conditions and increased PO intake        REVIEW OF SYSTEMS     Review of Systems   Unable to perform ROS: Patient nonverbal     OBJECTIVE     Vitals:    04/02/24 0953   BP: 118/77   Pulse: 67   Resp: 18   Temp: 97.9 °F (36.6 °C)   TempSrc: Temporal   SpO2: 99%     Physical Exam  Vitals and nursing note reviewed.   Constitutional:       General: He is not in acute distress.     Appearance: He is well-developed. He is not ill-appearing.   HENT:      Nose: Nose normal. No congestion.      Mouth/Throat:      Mouth: Mucous membranes are moist.   Eyes:      General: No scleral icterus.     Conjunctiva/sclera: Conjunctivae normal.   Cardiovascular:      Rate and Rhythm: Normal rate and regular rhythm.      Heart sounds: Normal heart sounds. No murmur heard.  Pulmonary:      Effort: Pulmonary effort is normal. No respiratory distress.      Breath sounds: Normal breath sounds. No wheezing, rhonchi or rales.   Abdominal:      General: Bowel sounds are normal.      Palpations: Abdomen is soft.      Tenderness: There is no abdominal tenderness. There is no guarding or rebound.   Musculoskeletal:      Cervical back: Neck supple.      Right lower leg: No edema.      Left lower leg: No edema.   Skin:     General: Skin is warm and dry.      Capillary Refill: Capillary refill " "takes less than 2 seconds.   Neurological:      Mental Status: He is alert.   Psychiatric:         Mood and Affect: Mood normal.         Behavior: Behavior normal.       Pertinent Laboratory/Diagnostic Studies:      Lab Units 01/02/24  1112   HDL mg/dL 68   LDL CALC mg/dL 71               Invalid input(s): \"LABALBU\"      No results found for: \"PHOS\"           No results found for: \"TROPONINI\"  ABG:No results found for: \"PHART\", \"ZIV9WLL\", \"PO2ART\", \"ASE8HVQ\", \"J5ELABUY\", \"BEART\", \"SOURCE\"    CURRENT MEDICATIONS     Current Outpatient Medications:     acetaminophen (TYLENOL) 160 mg/5 mL liquid, Take 24.2 mL (774.4 mg total) by mouth every 4 (four) hours as needed for mild pain, Disp: 120 mL, Rfl: 3    carbamide peroxide (DEBROX) 6.5 % otic solution, Administer 5 drops into both ears 2 (two) times a day, Disp: 15 mL, Rfl: 0    Diclofenac Sodium (VOLTAREN) 1 %, Apply 2 g topically 4 (four) times a day, Disp: 150 g, Rfl: 3    ergocalciferol (VITAMIN D2) 50,000 units, Take 1 capsule (50,000 Units total) by mouth once a week, Disp: 8 capsule, Rfl: 1    ibuprofen (MOTRIN) 600 mg tablet, as needed (Patient not taking: Reported on 6/22/2023), Disp: , Rfl:     Misc. Devices (WALKER) MISC, Use, Disp: , Rfl:     Multiple Vitamins-Minerals (Multivitamin) LIQD, Take 5 mL by mouth in the morning, Disp: 240 mL, Rfl: 6    PAST MEDICAL & SURGICAL HISTORY     Past Medical History:   Diagnosis Date    Cerebral palsy (HCC)     Leukopenia     Last Assessed:  10/9/15    Thrombocytopenia (HCC)     Last Assessed:  10/13/14    Tired 11/29/2019     No past surgical history on file.  SOCIAL & FAMILY HISTORY     Social History     Socioeconomic History    Marital status: Single     Spouse name: Not on file    Number of children: Not on file    Years of education: Not on file    Highest education level: Not on file   Occupational History    Not on file   Tobacco Use    Smoking status: Never    Smokeless tobacco: Never   Vaping Use    Vaping " status: Never Used   Substance and Sexual Activity    Alcohol use: No    Drug use: No    Sexual activity: Never   Other Topics Concern    Not on file   Social History Narrative    Not on file     Social Determinants of Health     Financial Resource Strain: Low Risk  (1/2/2024)    Overall Financial Resource Strain (CARDIA)     Difficulty of Paying Living Expenses: Not hard at all   Food Insecurity: No Food Insecurity (1/2/2024)    Hunger Vital Sign     Worried About Running Out of Food in the Last Year: Never true     Ran Out of Food in the Last Year: Never true   Transportation Needs: No Transportation Needs (1/2/2024)    PRAPARE - Transportation     Lack of Transportation (Medical): No     Lack of Transportation (Non-Medical): No   Physical Activity: Not on file   Stress: Not on file   Social Connections: Not on file   Intimate Partner Violence: Not on file   Housing Stability: Low Risk  (4/2/2024)    Housing Stability Vital Sign     Unable to Pay for Housing in the Last Year: No     Number of Places Lived in the Last Year: 1     Unstable Housing in the Last Year: No     Social History     Substance and Sexual Activity   Alcohol Use No       Social History     Substance and Sexual Activity   Drug Use No     Social History     Tobacco Use   Smoking Status Never   Smokeless Tobacco Never     No family history on file.  ==  Deborah Frederick DO    [facility] Internal Medicine [facility]    [address], [address]  [place] [address]  Office: [phone]  Fax: [phone]

## 2024-04-02 NOTE — ASSESSMENT & PLAN NOTE
Nonverbal and generally spends time in wheelchair at baseline  Family very active in care     Dependent on all IADLs and most ADLS (occasionally able to feed self and signal when need bathroom)  Given info for free [facility] and PT referral  Given VNA referral for PT as pt meets criteria, gave pt family both VNA numbers to contact and make appointments  Pt BM regular and eating more  No need for refills at this time  Patient does occasionally ambulate with walker and family interested in home VNA to improve strength and mobility/ROM  F/u 6 months   Declines filling out polst form at this time

## 2024-04-03 ENCOUNTER — HOME CARE VISIT (OUTPATIENT)
Dept: HOME HEALTH SERVICES | Facility: HOME HEALTHCARE | Age: 44
End: 2024-04-03
Payer: COMMERCIAL

## 2024-04-03 VITALS — SYSTOLIC BLOOD PRESSURE: 126 MMHG | OXYGEN SATURATION: 94 % | DIASTOLIC BLOOD PRESSURE: 72 MMHG | HEART RATE: 55 BPM

## 2024-04-03 PROCEDURE — 400013 VN SOC

## 2024-04-03 PROCEDURE — G0151 HHCP-SERV OF PT,EA 15 MIN: HCPCS

## 2024-04-03 NOTE — CASE COMMUNICATION
[facility] has admitted your patient to Home Health service with the following disciplines:      PT  This report is informational only, no response is needed  Primary focus of home health care NEUROLOGICAL    Patient stated goals of care to increase walking with less pain, improve range of motion, improve posture in sit and stand to dec falls    Anticipated visit pattern 1WK1 AND STARTING WEEK OF 04/07/24 2WK3    See medication list  - meds in home differ from AVS  Pt has all medications in the home, no issues at this time.    Significant clinical findings weakness BLE with RLE>LLE, dec static and dynamic balance, nonverbal with limited ability to understand verbal communication->does better with physical demonstration and communication  Potential barriers to goal achievement narrow walkways on first floor, limited mobility and movement due to pain and desire to mo ve  Other pertinent information    Thank you for allowing us to participate in the care of your patient.      Erika Gross  DPT

## 2024-04-05 ENCOUNTER — HOME CARE VISIT (OUTPATIENT)
Dept: HOME HEALTH SERVICES | Facility: HOME HEALTHCARE | Age: 44
End: 2024-04-05
Payer: COMMERCIAL

## 2024-04-07 ENCOUNTER — HOME CARE VISIT (OUTPATIENT)
Dept: HOME HEALTH SERVICES | Facility: HOME HEALTHCARE | Age: 44
End: 2024-04-07
Payer: COMMERCIAL

## 2024-04-08 ENCOUNTER — HOME CARE VISIT (OUTPATIENT)
Dept: HOME HEALTH SERVICES | Facility: HOME HEALTHCARE | Age: 44
End: 2024-04-08
Payer: COMMERCIAL

## 2024-04-08 VITALS — OXYGEN SATURATION: 97 % | DIASTOLIC BLOOD PRESSURE: 80 MMHG | HEART RATE: 61 BPM | SYSTOLIC BLOOD PRESSURE: 115 MMHG

## 2024-04-08 PROCEDURE — G0152 HHCP-SERV OF OT,EA 15 MIN: HCPCS

## 2024-04-09 ENCOUNTER — HOME CARE VISIT (OUTPATIENT)
Dept: HOME HEALTH SERVICES | Facility: HOME HEALTHCARE | Age: 44
End: 2024-04-09
Payer: COMMERCIAL

## 2024-04-09 PROCEDURE — G0151 HHCP-SERV OF PT,EA 15 MIN: HCPCS

## 2024-04-10 VITALS — OXYGEN SATURATION: 92 % | SYSTOLIC BLOOD PRESSURE: 118 MMHG | DIASTOLIC BLOOD PRESSURE: 72 MMHG | HEART RATE: 58 BPM

## 2024-04-11 ENCOUNTER — HOME CARE VISIT (OUTPATIENT)
Dept: HOME HEALTH SERVICES | Facility: HOME HEALTHCARE | Age: 44
End: 2024-04-11
Payer: COMMERCIAL

## 2024-04-11 VITALS — OXYGEN SATURATION: 97 % | SYSTOLIC BLOOD PRESSURE: 110 MMHG | HEART RATE: 71 BPM | DIASTOLIC BLOOD PRESSURE: 74 MMHG

## 2024-04-11 PROCEDURE — G0151 HHCP-SERV OF PT,EA 15 MIN: HCPCS

## 2024-04-12 ENCOUNTER — HOME CARE VISIT (OUTPATIENT)
Dept: HOME HEALTH SERVICES | Facility: HOME HEALTHCARE | Age: 44
End: 2024-04-12
Payer: COMMERCIAL

## 2024-04-12 PROCEDURE — G0152 HHCP-SERV OF OT,EA 15 MIN: HCPCS

## 2024-04-15 ENCOUNTER — TELEPHONE (OUTPATIENT)
Dept: INTERNAL MEDICINE CLINIC | Facility: CLINIC | Age: 44
End: 2024-04-15

## 2024-04-15 NOTE — TELEPHONE ENCOUNTER
Folder Color- Blue     Name of Form- [employer] Order    Form to be filled out by- Dr Torres    Form to be Faxed (fax number), Mailed (address), or picked up (by whom)    Patient made aware of 10 business day policy.

## 2024-04-16 ENCOUNTER — HOME CARE VISIT (OUTPATIENT)
Dept: HOME HEALTH SERVICES | Facility: HOME HEALTHCARE | Age: 44
End: 2024-04-16
Payer: COMMERCIAL

## 2024-04-17 ENCOUNTER — HOME CARE VISIT (OUTPATIENT)
Dept: HOME HEALTH SERVICES | Facility: HOME HEALTHCARE | Age: 44
End: 2024-04-17
Payer: COMMERCIAL

## 2024-04-17 VITALS — DIASTOLIC BLOOD PRESSURE: 80 MMHG | HEART RATE: 64 BPM | SYSTOLIC BLOOD PRESSURE: 120 MMHG | OXYGEN SATURATION: 98 %

## 2024-04-17 PROCEDURE — G0152 HHCP-SERV OF OT,EA 15 MIN: HCPCS

## 2024-04-19 ENCOUNTER — HOME CARE VISIT (OUTPATIENT)
Dept: HOME HEALTH SERVICES | Facility: HOME HEALTHCARE | Age: 44
End: 2024-04-19
Payer: COMMERCIAL

## 2024-04-19 VITALS — HEART RATE: 85 BPM | DIASTOLIC BLOOD PRESSURE: 60 MMHG | OXYGEN SATURATION: 97 % | SYSTOLIC BLOOD PRESSURE: 110 MMHG

## 2024-04-19 PROCEDURE — G0151 HHCP-SERV OF PT,EA 15 MIN: HCPCS

## 2024-04-22 ENCOUNTER — HOME CARE VISIT (OUTPATIENT)
Dept: HOME HEALTH SERVICES | Facility: HOME HEALTHCARE | Age: 44
End: 2024-04-22
Payer: COMMERCIAL

## 2024-04-22 VITALS — OXYGEN SATURATION: 96 % | DIASTOLIC BLOOD PRESSURE: 80 MMHG | SYSTOLIC BLOOD PRESSURE: 118 MMHG

## 2024-04-22 PROCEDURE — G0151 HHCP-SERV OF PT,EA 15 MIN: HCPCS

## 2024-04-25 ENCOUNTER — HOME CARE VISIT (OUTPATIENT)
Dept: HOME HEALTH SERVICES | Facility: HOME HEALTHCARE | Age: 44
End: 2024-04-25
Payer: COMMERCIAL

## 2024-04-25 VITALS — SYSTOLIC BLOOD PRESSURE: 104 MMHG | DIASTOLIC BLOOD PRESSURE: 66 MMHG | OXYGEN SATURATION: 99 % | HEART RATE: 56 BPM

## 2024-04-25 PROCEDURE — G0151 HHCP-SERV OF PT,EA 15 MIN: HCPCS

## 2024-05-09 ENCOUNTER — TELEPHONE (OUTPATIENT)
Dept: INTERNAL MEDICINE CLINIC | Facility: CLINIC | Age: 44
End: 2024-05-09

## 2024-05-09 NOTE — TELEPHONE ENCOUNTER
Good morning, I am filling out an application for Storm to go to [facility] in [place]. [facility] is requesting medical documentation (including diagnosis and medications) for Storm's potential admission.      Here is their contact info:     [facility]  ANGELIA Swan  Admission Coordinator  [address] [place] [address]  NEETA Warner [address]  P: [phone]  F: [phone]   [email or web address]  ____________________________________________    I spoke with Terra and she is going to be sending a RAS from to patient to fill out and then send back to her. She will then fax over the RAS for us to send to medical records. I LVM with patients brother to update him and to look out for letter in mail.    Any questions told to reach out to our office.

## 2024-05-13 ENCOUNTER — TELEPHONE (OUTPATIENT)
Dept: INTERNAL MEDICINE CLINIC | Facility: CLINIC | Age: 44
End: 2024-05-13

## 2024-10-08 ENCOUNTER — TELEPHONE (OUTPATIENT)
Dept: INTERNAL MEDICINE CLINIC | Facility: CLINIC | Age: 44
End: 2024-10-08

## 2024-10-09 ENCOUNTER — TELEPHONE (OUTPATIENT)
Dept: INTERNAL MEDICINE CLINIC | Facility: CLINIC | Age: 44
End: 2024-10-09

## 2024-10-09 NOTE — TELEPHONE ENCOUNTER
Patients brother Kiko would like a referral for brother to have a swallow test done. He needs this for a program the patient will be in. If you have any questions please call [phone]

## 2024-10-10 DIAGNOSIS — G80.0 SPASTIC QUADRIPLEGIC CEREBRAL PALSY (HCC): Primary | ICD-10-CM

## 2024-10-15 ENCOUNTER — TELEPHONE (OUTPATIENT)
Dept: INTERNAL MEDICINE CLINIC | Facility: CLINIC | Age: 44
End: 2024-10-15

## 2024-10-15 NOTE — TELEPHONE ENCOUNTER
Folder Color- Blue     Name of Form- [employer] [place]     Form to be filled out by- Dr. Frederick     Form to be Faxed ATTENTION Terra Escalante [phone]    Patient made aware of 10 business day policy.

## 2024-10-17 ENCOUNTER — TELEPHONE (OUTPATIENT)
Dept: INTERNAL MEDICINE CLINIC | Facility: CLINIC | Age: 44
End: 2024-10-17

## 2024-10-17 NOTE — TELEPHONE ENCOUNTER
I called and informed patients brother that form was faxed. Physical copy was placed in drawer with the larger folders for . He will get form during patients appt 10/31

## 2024-10-22 ENCOUNTER — HOSPITAL ENCOUNTER (OUTPATIENT)
Dept: RADIOLOGY | Facility: HOSPITAL | Age: 44
Discharge: HOME | End: 2024-10-22
Attending: STUDENT IN AN ORGANIZED HEALTH CARE EDUCATION/TRAINING PROGRAM
Payer: COMMERCIAL

## 2024-10-22 DIAGNOSIS — G80.0 SPASTIC QUADRIPLEGIC CEREBRAL PALSY (HCC): ICD-10-CM

## 2024-10-22 PROCEDURE — 92611 MOTION FLUOROSCOPY/SWALLOW: CPT

## 2024-10-22 PROCEDURE — 74230 X-RAY XM SWLNG FUNCJ C+: CPT

## 2024-10-22 NOTE — PROCEDURES
Video Swallow Study      Patient Name: Caesar Lopez  Today's Date: 10/22/2024           Assessment Summary: pt presents with oral dysphagia due to slow, delayed oral mvts for bolus manipulation, but appeared w/ effective mastication of solids and good oral control of liquids. Pharyngeal swallow appeared WNL with no pharyngeal retention, laryngeal penetration or aspiration observed.            Recommendations:  Cont regular diet, cut in to bite sized pcs  Cont thin liquids by straw  Meds as tolerated with thin liquids  Aspiration precautions          General Information:  42 yo gentleman referred to [facility]  for a VBS by Dr. Torres  for dysphagia.  Pt w/ hx of Cerebral palsy, accompanied by his brother who reported pt eats regular foods and regular liquids by straw. He can takes meds w/ water, sometimes tilts his head back to aid w/ transfer. Pt is nonverbal.     Cognition:  alert, cooperative.     Speech/Swallow Mech:   Oral motor movements appeared  impaired;   Dentition was  natural;   Cough was NA.   Respiratory Status: RA;     Current diet: regular w/ thin liquids.    Prior VBS none     Pt was seen in radiology for a Video Barium Swallow Study, seated in the upright position and viewed laterally with the following consistencies: puree, solids, nectar thick liquids, thin liquids, barium pill w/ water by straw.     **viewing somewhat difficult due to pt's constant head movements.      Results are as follows:     **Images are available for review on PACS           Oral Stage:  pt w/ decreased labial closure for bolus retrieval from spoon; able to suck from straw w/ adequate oral control.  Bolus mastication/manipulation appeared slow and delayed, but effective. Slow bolus transfer noted with foods > liquids. Mild oral residue noted.       Lip Closure: decreased  Tongue Control During Bolus Hold:adequate cohesive bolus hold  Bolus Preparation/Mastication: prolonged but  effective   Bolus Transport/Lingual Motion: slow, delayed  Oral Residue: present  Velopharyngeal Closure: complete          Pharyngeal Stage: Swallow initiation was prompt with complete laryngeal excursion, tongue base retraction and airway closure. Epiglottic inversion was variable/intermittent. No pharyngeal residue, laryngeal penetration or aspiration observed.          Initiation of the Pharyngeal Swallow: prompt  Laryngeal Elevation: complete elevation   Anterior Hyoid Excursion: complete anterior mvt  Epiglottic Movement/Inversion:  inconsistent inversion  Laryngeal Vestibular Closure: complete closure  Pharyngeal Stripping Wave: adequate  Pharyngeal Contraction (from AP view): NA  Pharyngoesophageal segment Opening: complete distension  Base of Tongue Retraction: complete  Pharyngeal residue: none         Penetration/Aspiration:  Thin: 1  Nectar: 1  Honey: NA  Puree: 1  Solid:  1  Response to Aspiration: no aspiration  Strategies/Efficacy: NA    8-Point Penetration-Aspiration Scale   1 Material does not enter the airway   2 Material enters the airway, remains above the vocal folds, and is ejected  from the  airway    3 Material enters the airway, remains above the vocal folds, and is not ejected from the airway   4 Material enters the airway, contacts the vocal folds, and is ejected from the airway   5 Material enters the airway, contacts the vocal folds, and is not ejected from the airway    6 Material enters the airway, passes below the vocal folds and is ejected into the larynx or out of the airway    7 Material enters the airway, passes below the vocal folds, and is not ejected from the trachea despite effort    8 Material enters the airway, passes below the vocal folds, and no effort is made to eject                 Esophageal Stage:   Briefly assessed, no overt abnormality noted, all materials cleared the esophagus in a timely manner.       Marci Vega MA [facility]-SLP  Speech Pathologist  [place] license # [id number]  [id number]  [place] license # [id number]  Available via Secure Chat

## 2024-10-24 NOTE — TELEPHONE ENCOUNTER
Received more forms from Margaret SANTACRUZ. Forms were given to clinical and Dr. Frederick will review them and complete if appropriate.    Folder Color- Blue     Name of Form- Margaret SANTACRUZ     Form to be filled out by- Dr. Frederick     Form to be Faxed  ATTENTION [facility] [phone]     Patient made aware of 10 business day policy.

## 2024-10-31 ENCOUNTER — OFFICE VISIT (OUTPATIENT)
Dept: INTERNAL MEDICINE CLINIC | Facility: CLINIC | Age: 44
End: 2024-10-31

## 2024-10-31 VITALS
TEMPERATURE: 97.8 F | SYSTOLIC BLOOD PRESSURE: 124 MMHG | BODY MASS INDEX: 20.19 KG/M2 | OXYGEN SATURATION: 97 % | HEIGHT: 63 IN | RESPIRATION RATE: 14 BRPM | DIASTOLIC BLOOD PRESSURE: 68 MMHG | HEART RATE: 60 BPM

## 2024-10-31 DIAGNOSIS — G80.0 SPASTIC QUADRIPLEGIC CEREBRAL PALSY (HCC): Primary | ICD-10-CM

## 2024-10-31 DIAGNOSIS — Z00.00 ANNUAL PHYSICAL EXAM: ICD-10-CM

## 2024-10-31 PROCEDURE — 99213 OFFICE O/P EST LOW 20 MIN: CPT | Performed by: STUDENT IN AN ORGANIZED HEALTH CARE EDUCATION/TRAINING PROGRAM

## 2024-10-31 RX ORDER — ACETAMINOPHEN 160 MG/5ML
15 LIQUID ORAL EVERY 4 HOURS PRN
Qty: 473 ML | Refills: 5 | Status: SHIPPED | OUTPATIENT
Start: 2024-10-31

## 2024-10-31 RX ORDER — IBUPROFEN 100 MG/5ML
200 SUSPENSION ORAL EVERY 4 HOURS PRN
Qty: 473 ML | Refills: 5 | Status: SHIPPED | OUTPATIENT
Start: 2024-10-31 | End: 2024-10-31

## 2024-10-31 RX ORDER — IBUPROFEN 100 MG/5ML
200 SUSPENSION ORAL EVERY 4 HOURS PRN
Qty: 473 ML | Refills: 5 | Status: SHIPPED | OUTPATIENT
Start: 2024-10-31

## 2024-10-31 RX ORDER — CALCIUM CARB/VITAMIN D3/VIT K1 500-500-40
5 TABLET,CHEWABLE ORAL DAILY
Qty: 240 ML | Refills: 6 | Status: SHIPPED | OUTPATIENT
Start: 2024-10-31

## 2024-10-31 NOTE — PROGRESS NOTES
Ambulatory Visit  Name: Caesar Lopez      : 1980      MRN: [id number]  Encounter Provider: Deborah Frederick DO  Encounter Date: 10/31/2024   Encounter department: [facility]    Assessment & Plan  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HCC)  Nonverbal and generally spends time in wheelchair at baseline  Family very active in care     Dependent on all IADLs and most ADLS (occasionally able to feed self and signal when need bathroom)  Given info for free [facility] and PT referral  Given VNA referral for PT as pt meets criteria, gave pt family both VNA numbers to contact and make appointments  Pt BM regular and eating more  No need for refills at this time  Patient does occasionally ambulate with walker and family interested in home VNA to improve strength and mobility/ROM  F/u 6 months annual physical  Declines filling out polst form at this time  Paperwork for day program in process     Sent in new rx for multivitamins, tylenol, and NSAIDs to [facility] pharmacy since closest to program    Orders:    ibuprofen (MOTRIN) 100 mg/5 mL suspension; Take 10 mL (200 mg total) by mouth every 4 (four) hours as needed for mild pain or moderate pain    BMI Counseling: Body mass index is 20.19 kg/m². Follow-up plan was not completed due to elderly patient (65+ years old) where weight reduction/weight gain would complicate underlying health condition such as: illness or physical disability and mental illness, dementia, or confusion.         History of Present Illness      PMHx of spastic quadriplegia 2/2 CP and general non-verbal though does phonate with special family language    No new complaints, is working to get into day program, which paperwork currently being worked on by this provider. No new concerns or complaints from family members    Brother Lalo ([facility]) with pt today.    Review of Systems   Unable to perform ROS: Patient nonverbal     Past Medical History:   Diagnosis  "Date    Cerebral palsy (HCC)     Leukopenia     Last Assessed:  10/9/15    Thrombocytopenia (HCC)     Last Assessed:  10/13/14    Tired 11/29/2019     History reviewed. No pertinent surgical history.  History reviewed. No pertinent family history.  Social History     Tobacco Use    Smoking status: Never    Smokeless tobacco: Never   Vaping Use    Vaping status: Never Used   Substance and Sexual Activity    Alcohol use: No    Drug use: No    Sexual activity: Never     Current Outpatient Medications on File Prior to Visit   Medication Sig    acetaminophen (TYLENOL) 160 mg/5 mL liquid Take 24.2 mL (774.4 mg total) by mouth every 4 (four) hours as needed for mild pain    carbamide peroxide (DEBROX) 6.5 % otic solution Administer 5 drops into both ears 2 (two) times a day    Diclofenac Sodium (VOLTAREN) 1 % Apply 2 g topically 4 (four) times a day    ergocalciferol (VITAMIN D2) 50,000 units Take 1 capsule (50,000 Units total) by mouth once a week    Misc. Devices (WALKER) MISC Use    Multiple Vitamins-Minerals (Multivitamin) LIQD Take 5 mL by mouth in the morning     Allergies   Allergen Reactions    Chloroquine      Annotation - [id number]: ITCHY     Immunization History   Administered Date(s) Administered    COVID-19 PFIZER VACCINE 0.3 ML IM 04/17/2021, 05/08/2021    INFLUENZA 11/01/2015, 10/25/2017    Influenza Quadrivalent Preservative Free 3 years and older IM 10/06/2016    Influenza Split 09/01/2014    Influenza, injectable, quadrivalent, preservative free 0.5 mL 01/19/2023, 01/02/2024    Influenza, seasonal, injectable 10/13/2014, 10/09/2015    Tdap 01/02/2024    Tuberculin Skin Test-PPD Intradermal 03/23/2022     Objective     /68 (BP Location: Right arm, Patient Position: Sitting, Cuff Size: Standard)   Pulse 60   Temp 97.8 °F (36.6 °C) (Temporal)   Resp 14   Ht 5' 3\" (1.6 m)   SpO2 97%   BMI 20.19 kg/m²     Physical Exam  Vitals and nursing note reviewed.   Constitutional:       General: He is not in " acute distress.     Appearance: He is well-developed. He is not ill-appearing.   HENT:      Nose: Nose normal. No congestion.      Mouth/Throat:      Mouth: Mucous membranes are moist.   Eyes:      General: No scleral icterus.     Conjunctiva/sclera: Conjunctivae normal.   Cardiovascular:      Rate and Rhythm: Normal rate and regular rhythm.      Heart sounds: Normal heart sounds. No murmur heard.  Pulmonary:      Effort: Pulmonary effort is normal. No respiratory distress.      Breath sounds: Normal breath sounds. No wheezing, rhonchi or rales.   Abdominal:      General: Bowel sounds are normal.      Palpations: Abdomen is soft.      Tenderness: There is no abdominal tenderness. There is no guarding or rebound.   Musculoskeletal:      Cervical back: Neck supple.      Right lower leg: No edema.      Left lower leg: No edema.   Skin:     General: Skin is warm and dry.      Capillary Refill: Capillary refill takes less than 2 seconds.   Neurological:      Mental Status: He is alert.      Comments: B/l leg contractures, braced; b/l arm contractures with muscle wasting in thenar eminence and temporal area   Psychiatric:         Mood and Affect: Mood normal.         Behavior: Behavior normal.      Comments: Phonates and special signing language with family, communicates well with family; mildly interactive with physician       Administrative Statements   I have spent a total time of 30 minutes in caring for this patient on the day of the visit/encounter including Instructions for management, Patient and family education, Importance of tx compliance, Counseling / Coordination of care, Documenting in the medical record, Reviewing / ordering tests, medicine, procedures  , Obtaining or reviewing history  , and Communicating with other healthcare professionals .

## 2024-10-31 NOTE — ASSESSMENT & PLAN NOTE
Nonverbal and generally spends time in wheelchair at baseline  Family very active in care     Dependent on all IADLs and most ADLS (occasionally able to feed self and signal when need bathroom)  Given info for free [facility] and PT referral  Given VNA referral for PT as pt meets criteria, gave pt family both VNA numbers to contact and make appointments  Pt BM regular and eating more  No need for refills at this time  Patient does occasionally ambulate with walker and family interested in home VNA to improve strength and mobility/ROM  F/u 6 months annual physical  Declines filling out polst form at this time  Paperwork for day program in process     Sent in new rx for multivitamins, tylenol, and NSAIDs to [facility] pharmacy since closest to program    Orders:    ibuprofen (MOTRIN) 100 mg/5 mL suspension; Take 10 mL (200 mg total) by mouth every 4 (four) hours as needed for mild pain or moderate pain

## 2024-11-01 ENCOUNTER — TELEPHONE (OUTPATIENT)
Dept: INTERNAL MEDICINE CLINIC | Facility: CLINIC | Age: 44
End: 2024-11-01

## 2024-11-15 DIAGNOSIS — G80.0 SPASTIC QUADRIPLEGIC CEREBRAL PALSY (HCC): ICD-10-CM

## 2024-11-15 DIAGNOSIS — Z00.00 ANNUAL PHYSICAL EXAM: ICD-10-CM

## 2024-11-15 RX ORDER — IBUPROFEN 100 MG/5ML
200 SUSPENSION ORAL EVERY 4 HOURS PRN
Qty: 473 ML | Refills: 5 | Status: SHIPPED | OUTPATIENT
Start: 2024-11-15

## 2024-11-15 RX ORDER — CALCIUM CARB/VITAMIN D3/VIT K1 500-500-40
5 TABLET,CHEWABLE ORAL DAILY
Qty: 240 ML | Refills: 6 | Status: SHIPPED | OUTPATIENT
Start: 2024-11-15

## 2024-11-15 RX ORDER — ACETAMINOPHEN 160 MG/5ML
15 LIQUID ORAL EVERY 4 HOURS PRN
Qty: 473 ML | Refills: 3 | Status: SHIPPED | OUTPATIENT
Start: 2024-11-15

## 2024-11-27 ENCOUNTER — TELEPHONE (OUTPATIENT)
Dept: INTERNAL MEDICINE CLINIC | Facility: CLINIC | Age: 44
End: 2024-11-27

## 2024-11-27 DIAGNOSIS — G80.0 SPASTIC QUADRIPLEGIC CEREBRAL PALSY (HCC): Primary | ICD-10-CM

## 2024-11-27 NOTE — TELEPHONE ENCOUNTER
Patients brother returned call. He said that patient is usually a size 9 but he said you should order for one size up. He was not so sure so I told him that he would receive a call back.

## 2024-11-27 NOTE — TELEPHONE ENCOUNTER
Care taker Dev called because he needs a new script for the patients leg braces. They are cracked in several places and he doesn't want to take them to be repaired.    Please call Dev at [phone]

## 2024-11-27 NOTE — TELEPHONE ENCOUNTER
Left message on machine for brother Méndez to advise we need more information. Need to know size and what type of brace he needs (see order)

## 2024-11-29 NOTE — TELEPHONE ENCOUNTER
Patients brother Rishi called to ask if there was any progress with the script. He stated patient had received the braces about 2 yrs earlier from the [facility]  Brother Edinson can be reached at [phone]

## 2024-12-04 NOTE — TELEPHONE ENCOUNTER
Prosper Ellsworth MD   [facility]  [address] [place] [address]     PATIENT NAME: Chris Frazier  : 1939  DATE: 22  MRN: [id number]      Billing Provider: Prosper Ellsworth MD  Level of Service:   Patient PCP Information     Provider PCP Type    Prosper Ellsworth MD General          Reason for Visit / Chief Complaint: Cough (Cough and congestion since Monday.) and Back Pain (C/o right low back pain after rolling off the bed Monday night.)         History of Present Illness / Problem Focused Workflow     Chris Frazier presents to the clinic with Cough (Cough and congestion since Monday.) and Back Pain (C/o right low back pain after rolling off the bed Monday night.)     HPI    Review of Systems     Review of Systems   Constitutional: Negative for activity change, appetite change, fatigue and fever.   HENT: Negative for nasal congestion, ear pain, hearing loss, sinus pressure/congestion and sore throat.    Respiratory: Positive for cough. Negative for chest tightness and shortness of breath.    Cardiovascular: Negative for chest pain and palpitations.   Gastrointestinal: Negative for abdominal pain, constipation and fecal incontinence.   Genitourinary: Negative for bladder incontinence, difficulty urinating and erectile dysfunction.   Musculoskeletal: Positive for back pain. Negative for arthralgias.   Integumentary:  Negative for rash.   Neurological: Negative for dizziness and headaches.        Medical / Social / Family History     Past Medical History:   Diagnosis Date    Cancer     SKIN CANCER    COPD (chronic obstructive pulmonary disease)     Coronary artery disease     Diabetes mellitus     Hypertension        Past Surgical History:   Procedure Laterality Date    COLON SURGERY      due to colon leaking    CORONARY ANGIOPLASTY WITH STENT PLACEMENT      3 stents    PROSTATE SURGERY      SKIN BIOPSY      RIGHT EAR       Social History  Chris MARTINEZ  Contacted patient's brother Edinson at . Introduced self and role. Patient's brother updated scripts were sent to  Clinic for both right and left leg brace replacement today. Brother stated he will follow up with  clinic and aware to contact office with any questions/concerns.        Freddie  reports that he has quit smoking. He has never used smokeless tobacco. He reports previous alcohol use. He reports that he does not use drugs.    Family History  Chris Frazier  family history includes Heart disease in his mother; Hyperlipidemia in his mother; Hypertension in his mother; Prostate cancer in his father.    Medications and Allergies     Medications  Outpatient Medications Marked as Taking for the 4/14/22 encounter (Office Visit) with Prosper Ellsworth MD   Medication Sig Dispense Refill    albuterol sulfate (PROAIR RESPICLICK) 90 mcg/actuation inhaler Inhale 2 puffs into the lungs every 6 (six) hours as needed for Wheezing. Rescue      albuterol-ipratropium (DUO-NEB) 2.5 mg-0.5 mg/3 mL nebulizer solution Take 3 mLs by nebulization every 6 (six) hours.      apixaban (ELIQUIS) 5 mg Tab Take 5 mg by mouth 2 (two) times daily.      ascorbic acid, vitamin C, (VITAMIN C) 500 MG tablet Take 500 mg by mouth once daily.      calcium carbonate/vitamin D3 (VITAMIN D-3 ORAL) Take 1 tablet by mouth once daily.      carvediloL (COREG) 3.125 MG tablet Take 1 tablet (3.125 mg total) by mouth 2 (two) times daily. 180 tablet 0    doxycycline (MONODOX) 100 MG capsule Take 1 capsule by mouth 2 (two) times daily.      EScitalopram oxalate (LEXAPRO) 10 MG tablet Take 1 tablet (10 mg total) by mouth once daily. 90 tablet 0    ezetimibe (ZETIA) 10 mg tablet Take 1 tablet (10 mg total) by mouth once daily. 90 tablet 0    fluticasone-umeclidin-vilanter (TRELEGY ELLIPTA) 200-62.5-25 mcg inhaler Inhale 1 puff into the lungs once daily. 60 each 5    furosemide (LASIX) 20 MG tablet Take 1 tablet (20 mg total) by mouth as needed (edema). 30 tablet 0    glipizide-metformin (METAGLIP) 5-500 mg per tablet Take 1 tablet by mouth 2 (two) times daily before meals. 180 tablet 0    guaiFENesin (MUCINEX) 600 mg 12 hr tablet Take 2 tablets (1,200 mg total) by mouth 2 (two) times daily. 30 tablet 0    hydrALAZINE  (APRESOLINE) 50 MG tablet Take 1 tablet (50 mg total) by mouth 3 (three) times daily. 90 tablet 5    levocetirizine (XYZAL) 5 MG tablet Take 1 tablet (5 mg total) by mouth every evening. 30 tablet 11    magnesium oxide (MAG-OX) 400 mg (241.3 mg magnesium) tablet Take 1 tablet (400 mg total) by mouth once daily. 90 tablet 0    metFORMIN (GLUCOPHAGE) 500 MG tablet Take 1 tablet (500 mg total) by mouth 2 (two) times daily. 180 tablet 0    mupirocin (BACTROBAN) 2 % ointment Apply topically 3 (three) times daily.      olmesartan-hydrochlorothiazide (BENICAR HCT) 40-25 mg per tablet Take 1 tablet by mouth once daily. 90 tablet 0    polyethylene glycol (GLYCOLAX) 17 gram/dose powder Take 17 g by mouth once daily.      simvastatin (ZOCOR) 40 MG tablet Take 1 tablet (40 mg total) by mouth every evening. 90 tablet 0    tamsulosin (FLOMAX) 0.4 mg Cap Take 1 capsule (0.4 mg total) by mouth once daily. 90 capsule 0    temazepam (RESTORIL) 7.5 MG Cap Take 2 capsules (15 mg total) by mouth nightly as needed (sleep). 60 capsule 2    ZINC ORAL Take 1 tablet by mouth once daily.         Allergies  Review of patient's allergies indicates:   Allergen Reactions    Norvasc [amlodipine] Swelling       Physical Examination     Vitals:    04/14/22 1502   BP: (!) 127/51   Pulse: 66   Temp: 98 °F (36.7 °C)     Physical Exam  Constitutional:       General: He is not in acute distress.     Appearance: He is not ill-appearing.   HENT:      Head: Normocephalic and atraumatic.      Right Ear: Tympanic membrane and ear canal normal.      Left Ear: Tympanic membrane and ear canal normal.      Nose: Congestion and rhinorrhea present.   Eyes:      Pupils: Pupils are equal, round, and reactive to light.   Cardiovascular:      Rate and Rhythm: Normal rate and regular rhythm.      Pulses: Normal pulses.      Heart sounds: No murmur heard.  Pulmonary:      Effort: No respiratory distress.      Breath sounds: No wheezing, rhonchi or rales.    Abdominal:      General: Bowel sounds are normal.      Palpations: Abdomen is soft.      Tenderness: There is no abdominal tenderness.      Hernia: No hernia is present.   Musculoskeletal:      Cervical back: Normal range of motion and neck supple.      Lumbar back: Spasms and tenderness present. No bony tenderness. Negative right straight leg raise test and negative left straight leg raise test.   Lymphadenopathy:      Cervical: No cervical adenopathy.   Skin:     General: Skin is warm and dry.   Neurological:      Mental Status: He is alert.   Psychiatric:         Behavior: Behavior normal.         Thought Content: Thought content normal.          Assessment and Plan (including Health Maintenance)   :    Plan:         Health Maintenance Due   Topic Date Due    Foot Exam  Never done    Eye Exam  Never done    TETANUS VACCINE  Never done       Problem List Items Addressed This Visit    None     Visit Diagnoses     Acute right-sided low back pain without sciatica    -  Primary    Relevant Medications    dexamethasone injection 2 mg (Completed)    methylPREDNISolone acetate injection 20 mg (Completed)    ketorolac injection 30 mg (Completed)    Cough        Lower respiratory infection            Acute right-sided low back pain without sciatica  -     dexamethasone injection 2 mg  -     methylPREDNISolone acetate injection 20 mg  -     ketorolac injection 30 mg    Cough    Lower respiratory infection    Other orders  -     azithromycin (Z-JOEL) 250 MG tablet; Take 1 tablet (250 mg total) by mouth once daily.  Dispense: 7 tablet; Refill: 0       Health Maintenance Topics with due status: Not Due       Topic Last Completion Date    Diabetes Urine Screening 04/07/2022    Lipid Panel 04/07/2022    Hemoglobin A1c 04/07/2022       Procedures     Future Appointments   Date Time Provider Department Center   10/12/2022  1:00 PM LOU Valladares [facility] OTIS Gutierrez        No follow-ups on file.       Signature:   Prosper Ellsworth MD  [facility]  [address] [place] [address]  [phone] Phone  [phone] Fax    Date of encounter: 4/14/22

## 2024-12-05 ENCOUNTER — TELEPHONE (OUTPATIENT)
Dept: INTERNAL MEDICINE CLINIC | Facility: CLINIC | Age: 44
End: 2024-12-05

## 2024-12-05 DIAGNOSIS — G80.0 SPASTIC QUADRIPLEGIC CEREBRAL PALSY (HCC): ICD-10-CM

## 2024-12-05 DIAGNOSIS — R26.9 GAIT ABNORMALITY: Primary | ICD-10-CM

## 2024-12-05 NOTE — TELEPHONE ENCOUNTER
Elijah from [facility]  is requesting a script for a wheelchair be faxed to [facility] wheelchair [facility]   Fax # [phone]    Elijah can be reached at [phone]

## 2024-12-24 ENCOUNTER — TELEPHONE (OUTPATIENT)
Dept: INTERNAL MEDICINE CLINIC | Facility: CLINIC | Age: 44
End: 2024-12-24

## 2024-12-24 NOTE — TELEPHONE ENCOUNTER
Received voicemail  -  Hi, I'm calling from [facility] regarding a Harry Hood addition. His YOB: 1980. A good callback number would be [phone]. I need to get a script from his doctor for a new wheelchair so I was calling to see if his doctor could just write a script for [facility] or if he or if he has to be seen again. His YOB: 1980, and call back number is [phone]. Thank you.        Please review and advise

## 2024-12-24 NOTE — TELEPHONE ENCOUNTER
"I see wheelchair repair approved 10/20/2020. Generally repairs may count as \"new\" DME under insurance. New DME [place] q5 years per insurance, thus family would have to call to see if he qualifies at this time, or needs to wait until oct 2025. If does qualify, I can gladly send in a script, thanks!"

## 2024-12-26 NOTE — TELEPHONE ENCOUNTER
Received call from Lilly from [facility] requesting script for wheelchair modifications. I read her Dr. Hunt last message.     Lilly understood but she still request the script. They will attempt to see if insurance will cover the modifications.  If possible, please fax script to [phone].     If you have any questions, Lilly can be reached at [phone]

## 2024-12-31 ENCOUNTER — APPOINTMENT (EMERGENCY)
Dept: RADIOLOGY | Facility: HOSPITAL | Age: 44
End: 2024-12-31
Payer: COMMERCIAL

## 2024-12-31 ENCOUNTER — HOSPITAL ENCOUNTER (EMERGENCY)
Facility: HOSPITAL | Age: 44
Discharge: HOME | End: 2024-12-31
Attending: EMERGENCY MEDICINE
Payer: COMMERCIAL

## 2024-12-31 VITALS
HEART RATE: 78 BPM | RESPIRATION RATE: 18 BRPM | TEMPERATURE: 97.2 F | DIASTOLIC BLOOD PRESSURE: 78 MMHG | SYSTOLIC BLOOD PRESSURE: 115 MMHG | OXYGEN SATURATION: 100 %

## 2024-12-31 DIAGNOSIS — E83.42 HYPOMAGNESEMIA: ICD-10-CM

## 2024-12-31 DIAGNOSIS — R19.7 NAUSEA, VOMITING, AND DIARRHEA: Primary | ICD-10-CM

## 2024-12-31 DIAGNOSIS — R11.2 NAUSEA, VOMITING, AND DIARRHEA: Primary | ICD-10-CM

## 2024-12-31 LAB
ALBUMIN SERPL BCG-MCNC: 4.3 G/DL (ref 3.5–5)
ALP SERPL-CCNC: 46 U/L (ref 34–104)
ALT SERPL W P-5'-P-CCNC: 12 U/L (ref 7–52)
ANION GAP SERPL CALCULATED.3IONS-SCNC: 9 MMOL/L (ref 4–13)
AST SERPL W P-5'-P-CCNC: 19 U/L (ref 13–39)
BASOPHILS # BLD AUTO: 0.02 THOUSANDS/ΜL (ref 0–0.1)
BASOPHILS NFR BLD AUTO: 0 % (ref 0–1)
BILIRUB SERPL-MCNC: 0.89 MG/DL (ref 0.2–1)
BNP SERPL-MCNC: 17 PG/ML (ref 0–100)
BUN SERPL-MCNC: 19 MG/DL (ref 5–25)
CALCIUM SERPL-MCNC: 9.2 MG/DL (ref 8.4–10.2)
CARDIAC TROPONIN I PNL SERPL HS: 3 NG/L (ref ?–50)
CHLORIDE SERPL-SCNC: 107 MMOL/L (ref 96–108)
CO2 SERPL-SCNC: 28 MMOL/L (ref 21–32)
CREAT SERPL-MCNC: 0.98 MG/DL (ref 0.6–1.3)
EOSINOPHIL # BLD AUTO: 0.06 THOUSAND/ΜL (ref 0–0.61)
EOSINOPHIL NFR BLD AUTO: 1 % (ref 0–6)
ERYTHROCYTE [DISTWIDTH] IN BLOOD BY AUTOMATED COUNT: 11.2 % (ref 11.6–15.1)
FLUAV RNA RESP QL NAA+PROBE: NEGATIVE
FLUBV RNA RESP QL NAA+PROBE: NEGATIVE
GFR SERPL CREATININE-BSD FRML MDRD: 93 ML/MIN/1.73SQ M
GLUCOSE SERPL-MCNC: 100 MG/DL (ref 65–140)
HCT VFR BLD AUTO: 44.1 % (ref 36.5–49.3)
HGB BLD-MCNC: 14.8 G/DL (ref 12–17)
IMM GRANULOCYTES # BLD AUTO: 0.03 THOUSAND/UL (ref 0–0.2)
IMM GRANULOCYTES NFR BLD AUTO: 0 % (ref 0–2)
LIPASE SERPL-CCNC: 26 U/L (ref 11–82)
LYMPHOCYTES # BLD AUTO: 0.34 THOUSANDS/ΜL (ref 0.6–4.47)
LYMPHOCYTES NFR BLD AUTO: 5 % (ref 14–44)
MAGNESIUM SERPL-MCNC: 1.7 MG/DL (ref 1.9–2.7)
MCH RBC QN AUTO: 33.9 PG (ref 26.8–34.3)
MCHC RBC AUTO-ENTMCNC: 33.6 G/DL (ref 31.4–37.4)
MCV RBC AUTO: 101 FL (ref 82–98)
MONOCYTES # BLD AUTO: 0.46 THOUSAND/ΜL (ref 0.17–1.22)
MONOCYTES NFR BLD AUTO: 7 % (ref 4–12)
NEUTROPHILS # BLD AUTO: 5.88 THOUSANDS/ΜL (ref 1.85–7.62)
NEUTS SEG NFR BLD AUTO: 87 % (ref 43–75)
NRBC BLD AUTO-RTO: 0 /100 WBCS
PLATELET # BLD AUTO: 118 THOUSANDS/UL (ref 149–390)
PMV BLD AUTO: 9.9 FL (ref 8.9–12.7)
POTASSIUM SERPL-SCNC: 3.7 MMOL/L (ref 3.5–5.3)
PROT SERPL-MCNC: 6.9 G/DL (ref 6.4–8.4)
RBC # BLD AUTO: 4.36 MILLION/UL (ref 3.88–5.62)
RSV RNA RESP QL NAA+PROBE: NEGATIVE
SARS-COV-2 RNA RESP QL NAA+PROBE: NEGATIVE
SODIUM SERPL-SCNC: 144 MMOL/L (ref 135–147)
WBC # BLD AUTO: 6.79 THOUSAND/UL (ref 4.31–10.16)

## 2024-12-31 PROCEDURE — 80053 COMPREHEN METABOLIC PANEL: CPT | Performed by: EMERGENCY MEDICINE

## 2024-12-31 PROCEDURE — 83690 ASSAY OF LIPASE: CPT | Performed by: EMERGENCY MEDICINE

## 2024-12-31 PROCEDURE — 96375 TX/PRO/DX INJ NEW DRUG ADDON: CPT

## 2024-12-31 PROCEDURE — 71045 X-RAY EXAM CHEST 1 VIEW: CPT

## 2024-12-31 PROCEDURE — 93005 ELECTROCARDIOGRAM TRACING: CPT

## 2024-12-31 PROCEDURE — 36415 COLL VENOUS BLD VENIPUNCTURE: CPT | Performed by: EMERGENCY MEDICINE

## 2024-12-31 PROCEDURE — 0241U HB NFCT DS VIR RESP RNA 4 TRGT: CPT | Performed by: EMERGENCY MEDICINE

## 2024-12-31 PROCEDURE — 96361 HYDRATE IV INFUSION ADD-ON: CPT

## 2024-12-31 PROCEDURE — 83880 ASSAY OF NATRIURETIC PEPTIDE: CPT | Performed by: EMERGENCY MEDICINE

## 2024-12-31 PROCEDURE — 99285 EMERGENCY DEPT VISIT HI MDM: CPT | Performed by: EMERGENCY MEDICINE

## 2024-12-31 PROCEDURE — 84484 ASSAY OF TROPONIN QUANT: CPT | Performed by: EMERGENCY MEDICINE

## 2024-12-31 PROCEDURE — 83735 ASSAY OF MAGNESIUM: CPT | Performed by: EMERGENCY MEDICINE

## 2024-12-31 PROCEDURE — 96365 THER/PROPH/DIAG IV INF INIT: CPT

## 2024-12-31 PROCEDURE — 99284 EMERGENCY DEPT VISIT MOD MDM: CPT

## 2024-12-31 PROCEDURE — 85025 COMPLETE CBC W/AUTO DIFF WBC: CPT | Performed by: EMERGENCY MEDICINE

## 2024-12-31 RX ORDER — MAGNESIUM SULFATE 1 G/100ML
1 INJECTION INTRAVENOUS ONCE
Status: COMPLETED | OUTPATIENT
Start: 2024-12-31 | End: 2024-12-31

## 2024-12-31 RX ORDER — ACETAMINOPHEN 160 MG/5ML
650 SUSPENSION ORAL ONCE
Status: COMPLETED | OUTPATIENT
Start: 2024-12-31 | End: 2024-12-31

## 2024-12-31 RX ORDER — ONDANSETRON 2 MG/ML
4 INJECTION INTRAMUSCULAR; INTRAVENOUS ONCE
Status: COMPLETED | OUTPATIENT
Start: 2024-12-31 | End: 2024-12-31

## 2024-12-31 RX ADMIN — MAGNESIUM SULFATE HEPTAHYDRATE 1 G: 1 INJECTION, SOLUTION INTRAVENOUS at 20:02

## 2024-12-31 RX ADMIN — SODIUM CHLORIDE 1000 ML: 0.9 INJECTION, SOLUTION INTRAVENOUS at 19:28

## 2024-12-31 RX ADMIN — ONDANSETRON 4 MG: 2 INJECTION INTRAMUSCULAR; INTRAVENOUS at 19:29

## 2024-12-31 RX ADMIN — ACETAMINOPHEN 650 MG: 650 SUSPENSION ORAL at 19:29

## 2024-12-31 NOTE — TELEPHONE ENCOUNTER
Received call from Lilly with [facility]. Introduced self and role. Lilly updated script was written and faxed to [facility] at the beginning of the month for wheelchair evaluate and treat. Lilly unaware that script was sent to facility. No other script needed at this time.     Lilly aware that she may contact office with any other questions/concerns.

## 2024-12-31 NOTE — ED NOTES
Cleansed for large amt of liquid brown stool.  Also vomit noted on pants.       Eliana Breen RN  12/31/24 8724

## 2024-12-31 NOTE — ED PROVIDER NOTES
Time reflects when diagnosis was documented in both MDM as applicable and the Disposition within this note       Time User Action Codes Description Comment    12/31/2024  7:13 PM MelanyradhaKain Add [R11.2,  R19.7] Nausea, vomiting, and diarrhea     12/31/2024  7:51 PM MelanyradhaKain [E83.42] Hypomagnesemia           ED Disposition       ED Disposition   Discharge    Condition   Stable    Date/Time   Tue Dec 31, 2024  8:16 PM    Comment   Caesar Lopez discharge to home/self care.                   Assessment & Plan       Medical Decision Making  44-year-old male presents for evaluation of vomiting and diarrhea.    On initial evaluation, patient in no acute distress.  VSS.  Differentials include but not limited to viral gastroenteritis gastritis, enteritis, food poisoning.  Labs, CXR ordered.  IV fluids, Tylenol and Zofran given for symptomatic management.  Labs significant for magnesium of 1.7, repleted.  Other labs unremarkable.  CXR negative for acute cardiopulmonary disease.  SPECT this is viral gastroenteritis.  Discussed findings with patient's care giver and recommended follow-up with PCP.    Amount and/or Complexity of Data Reviewed  Labs: ordered.  Radiology: ordered and independent interpretation performed.    Risk  OTC drugs.  Prescription drug management.             Medications   sodium chloride 0.9 % bolus 1,000 mL (0 mL Intravenous Stopped 12/31/24 2103)   ondansetron (ZOFRAN) injection 4 mg (4 mg Intravenous Given 12/31/24 1929)   acetaminophen (TYLENOL) oral suspension 650 mg (650 mg Oral Given 12/31/24 1929)   magnesium sulfate IVPB (premix) SOLN 1 g (0 g Intravenous Stopped 12/31/24 2102)       ED Risk Strat Scores                          SBIRT 20yo+      Flowsheet Row Most Recent Value   Initial Alcohol Screen: US AUDIT-C     1. How often do you have a drink containing alcohol? 0 Filed at: 12/31/2024 1822   2. How many drinks containing alcohol do you have on a typical day you are  drinking?  0 Filed at: 12/31/2024 1822   3a. Male UNDER 65: How often do you have five or more drinks on one occasion? 0 Filed at: 12/31/2024 1822   Audit-C Score 0 Filed at: 12/31/2024 1822   ABEL: How many times in the past year have you...    Used an illegal drug or used a prescription medication for non-medical reasons? Never Filed at: 12/31/2024 1822                            History of Present Illness       Chief Complaint   Patient presents with    Vomiting     Hx cerebral palsy.  Nonverbal.  3 episodes of vomiting.  Diarrhea.  Arrives with no caregiver at triage.  Vs stable enroute       Past Medical History:   Diagnosis Date    Cerebral palsy (HCC)     Leukopenia     Last Assessed:  10/9/15    Thrombocytopenia (HCC)     Last Assessed:  10/13/14    Tired 11/29/2019      History reviewed. No pertinent surgical history.   History reviewed. No pertinent family history.   Social History     Tobacco Use    Smoking status: Never    Smokeless tobacco: Never   Vaping Use    Vaping status: Never Used   Substance Use Topics    Alcohol use: No    Drug use: No      E-Cigarette/Vaping    E-Cigarette Use Never User       E-Cigarette/Vaping Substances      I have reviewed and agree with the history as documented.     4-year-old male with history of cerebral palsy presents for evaluation of vomiting and diarrhea.  He is nonverbal, and lives at home.  Per group home staff, patient was at baseline of health until about 4 PM today when he developed multiple episodes of vomiting, and 1 episode of diarrhea.  Has had no fever or chills.  Unknown sick contacts.  No other complaints at this time.          Review of Systems   Unable to perform ROS: Patient nonverbal           Objective       ED Triage Vitals [12/31/24 1822]   Temperature Pulse Blood Pressure Respirations SpO2 Patient Position - Orthostatic VS   (!) 97.2 °F (36.2 °C) 78 115/78 18 100 % --      Temp Source Heart Rate Source BP Location FiO2 (%) Pain Score    Oral  Monitor -- -- --      Vitals      Date and Time Temp Pulse SpO2 Resp BP Pain Score FACES Pain Rating User   12/31/24 1822 97.2 °F (36.2 °C) 78 100 % 18 115/78 -- 0 RR            Physical Exam  Vitals and nursing note reviewed.   Constitutional:       General: He is not in acute distress.     Appearance: He is well-developed.   HENT:      Head: Normocephalic and atraumatic.   Eyes:      Conjunctiva/sclera: Conjunctivae normal.   Cardiovascular:      Rate and Rhythm: Normal rate and regular rhythm.   Pulmonary:      Effort: Pulmonary effort is normal. No respiratory distress.      Breath sounds: Normal breath sounds.   Abdominal:      Palpations: Abdomen is soft.   Musculoskeletal:      Comments: Patient quadriplegic at baseline.   Skin:     General: Skin is warm and dry.      Capillary Refill: Capillary refill takes less than 2 seconds.   Neurological:      Mental Status: He is alert. Mental status is at baseline.         Results Reviewed       Procedure Component Value Units Date/Time    [facility], [id number]  (Normal) Collected: 12/31/24 1919    Lab Status: Final result Specimen: Nares from Nose Updated: 12/31/24 2007     SARS-CoV-2 Negative     INFLUENZA A PCR Negative     INFLUENZA B PCR Negative     RSV PCR Negative    Narrative:      This test has been performed using the CoV-2/Flu/RSV plus assay on the [employer] GeneXpert platform. This test has been validated by the  and verified by the performing laboratory.     This test is designed to amplify and detect the following: nucleocapsid (N), envelope (E), and RNA-dependent RNA polymerase (RdRP) genes of the SARS-CoV-2 genome; matrix (M), basic polymerase (PB2), and acidic protein (PA) segments of the influenza A genome; matrix (M) and non-structural protein (NS) segments of the influenza B genome, and the nucleocapsid genes of RSV A and RSV B.     Positive results are indicative of the presence of Flu A, Flu B, RSV, and/or  SARS-CoV-2 RNA. Positive results for SARS-CoV-2 or suspected novel influenza should be reported to state, local, or federal health departments according to local reporting requirements.      All results should be assessed in conjunction with clinical presentation and other laboratory markers for clinical management.     FOR PEDIATRIC PATIENTS - copy/paste COVID Guidelines URL to browser: [email or web address]       HS Troponin 0hr (reflex protocol) [[id number]]  (Normal) Collected: 12/31/24 1919    Lab Status: Final result Specimen: Blood from Arm, Left Updated: 12/31/24 1953     hs TnI 0hr 3 ng/L     B-Type Natriuretic Peptide(BNP) [[id number]]  (Normal) Collected: 12/31/24 1919    Lab Status: Final result Specimen: Blood from Arm, Left Updated: 12/31/24 1952     BNP 17 pg/mL     Comprehensive metabolic panel [[id number]] Collected: 12/31/24 1919    Lab Status: Final result Specimen: Blood from Arm, Left Updated: 12/31/24 1949     Sodium 144 mmol/L      Potassium 3.7 mmol/L      Chloride 107 mmol/L      CO2 28 mmol/L      ANION GAP 9 mmol/L      BUN 19 mg/dL      Creatinine 0.98 mg/dL      Glucose 100 mg/dL      Calcium 9.2 mg/dL      AST 19 U/L      ALT 12 U/L      Alkaline Phosphatase 46 U/L      Total Protein 6.9 g/dL      Albumin 4.3 g/dL      Total Bilirubin 0.89 mg/dL      eGFR 93 ml/min/1.73sq m     Narrative:      National Kidney Disease Foundation guidelines for Chronic Kidney Disease (CKD):     Stage 1 with normal or high GFR (GFR > 90 mL/min/1.73 square meters)    Stage 2 Mild CKD (GFR = 60-89 mL/min/1.73 square meters)    Stage 3A Moderate CKD (GFR = 45-59 mL/min/1.73 square meters)    Stage 3B Moderate CKD (GFR = 30-44 mL/min/1.73 square meters)    Stage 4 Severe CKD (GFR = 15-29 mL/min/1.73 square meters)    Stage 5 End Stage CKD (GFR <15 mL/min/1.73 square meters)  Note: GFR calculation is accurate only with a steady state creatinine    Magnesium  [[id number]]  (Abnormal) Collected: 12/31/24 1919    Lab Status: Final result Specimen: Blood from Arm, Left Updated: 12/31/24 1949     Magnesium 1.7 mg/dL     Lipase [[id number]]  (Normal) Collected: 12/31/24 1919    Lab Status: Final result Specimen: Blood from Arm, Left Updated: 12/31/24 1949     Lipase 26 u/L     CBC and differential [[id number]]  (Abnormal) Collected: 12/31/24 1919    Lab Status: Final result Specimen: Blood from Arm, Left Updated: 12/31/24 1936     WBC 6.79 Thousand/uL      RBC 4.36 Million/uL      Hemoglobin 14.8 g/dL      Hematocrit 44.1 %       fL      MCH 33.9 pg      MCHC 33.6 g/dL      RDW 11.2 %      MPV 9.9 fL      Platelets 118 Thousands/uL      nRBC 0 /100 WBCs      Segmented % 87 %      Immature Grans % 0 %      Lymphocytes % 5 %      Monocytes % 7 %      Eosinophils Relative 1 %      Basophils Relative 0 %      Absolute Neutrophils 5.88 Thousands/µL      Absolute Immature Grans 0.03 Thousand/uL      Absolute Lymphocytes 0.34 Thousands/µL      Absolute Monocytes 0.46 Thousand/µL      Eosinophils Absolute 0.06 Thousand/µL      Basophils Absolute 0.02 Thousands/µL             XR chest 1 view portable   ED Interpretation by Kain Mcghee MD (12/31 1930)   No infiltrate or pneumothorax      Final Interpretation by Gregorio Rizvi MD (01/01 0039)      No acute cardiopulmonary disease.            Workstation performed: [id number]             Procedures    ED Medication and Procedure Management   Prior to Admission Medications   Prescriptions Last Dose Informant Patient Reported? Taking?   Diclofenac Sodium (VOLTAREN) 1 %   No No   Sig: Apply 2 g topically 4 (four) times a day   Misc. Devices (WALKER) MISC   Yes No   Sig: Use   Multiple Vitamins-Minerals (Multivitamin) liquid   No No   Sig: Take 5 mL by mouth in the morning   acetaminophen (TYLENOL) 160 mg/5 mL liquid   No No   Sig: Take 24.2 mL (774.4 mg total) by mouth every 4 (four) hours as needed for mild pain   carbamide  peroxide (DEBROX) 6.5 % otic solution   No No   Sig: Administer 5 drops into both ears 2 (two) times a day   ergocalciferol (VITAMIN D2) 50,000 units   No No   Sig: Take 1 capsule (50,000 Units total) by mouth once a week   ibuprofen (MOTRIN) 100 mg/5 mL suspension   No No   Sig: Take 10 mL (200 mg total) by mouth every 4 (four) hours as needed for mild pain or moderate pain      Facility-Administered Medications: None     Discharge Medication List as of 12/31/2024  8:16 PM        CONTINUE these medications which have NOT CHANGED    Details   acetaminophen (TYLENOL) 160 mg/5 mL liquid Take 24.2 mL (774.4 mg total) by mouth every 4 (four) hours as needed for mild pain, Starting Fri 11/15/2024, Normal      carbamide peroxide (DEBROX) 6.5 % otic solution Administer 5 drops into both ears 2 (two) times a day, Starting Tue 1/2/2024, Normal      Diclofenac Sodium (VOLTAREN) 1 % Apply 2 g topically 4 (four) times a day, Starting Tue 1/2/2024, Normal      ergocalciferol (VITAMIN D2) 50,000 units Take 1 capsule (50,000 Units total) by mouth once a week, Starting Thu 1/4/2024, Normal      ibuprofen (MOTRIN) 100 mg/5 mL suspension Take 10 mL (200 mg total) by mouth every 4 (four) hours as needed for mild pain or moderate pain, Starting Fri 11/15/2024, Normal      Misc. Devices (WALKER) MISC Use, Starting Tue 10/29/2013, Historical Med      Multiple Vitamins-Minerals (Multivitamin) liquid Take 5 mL by mouth in the morning, Starting Fri 11/15/2024, Normal           No discharge procedures on file.  ED SEPSIS DOCUMENTATION   Time reflects when diagnosis was documented in both MDM as applicable and the Disposition within this note       Time User Action Codes Description Comment    12/31/2024  7:13 PM Kain Mcghee Add [R11.2,  R19.7] Nausea, vomiting, and diarrhea     12/31/2024  7:51 PM Kain Mcghee [E83.42] Hypomagnesemia                  Doris Archer MD  01/01/25 2057

## 2025-01-01 NOTE — ED PROCEDURE NOTE
PROCEDURE  ECG 12 Lead Documentation Only    Date/Time: 12/31/2024 7:30 PM    Performed by: Kain Mcghee MD  Authorized by: Kain Mcghee MD    Indications / Diagnosis:  N/v  ECG reviewed by me, the ED Provider: yes    Patient location:  ED  Rate:     ECG rate:  70    ECG rate assessment: normal    Rhythm:     Rhythm: sinus rhythm    QRS:     QRS axis:  Normal  ST segments:     ST segments:  Non-specific  T waves:     T waves: non-specific    Comments:      Normal sinus rhythm at 70, normal axis, , QRS 76, QTc 384, nonspecific ST-T wave abnormality, no definite evidence of acute ischemia       Kain Mcghee MD  12/31/24 1932

## 2025-01-01 NOTE — ED ATTENDING ATTESTATION
I interviewed, took the history and examined the patient.  I discussed the case with the Resident and reviewed the Resident’s note , prescribed medications, and orders placed.  I supervised the Resident and I agree with the Resident management plan as it was presented to me.  I was present in the clinic and examined the patient.    Kain Mcghee MD 12/31/24    Labs Reviewed   CBC AND DIFFERENTIAL - Abnormal       Result Value Ref Range Status    WBC 6.79  4.31 - 10.16 Thousand/uL Final    RBC 4.36  3.88 - 5.62 Million/uL Final    Hemoglobin 14.8  12.0 - 17.0 g/dL Final    Hematocrit 44.1  36.5 - 49.3 % Final     (*) 82 - 98 fL Final    MCH 33.9  26.8 - 34.3 pg Final    MCHC 33.6  31.4 - 37.4 g/dL Final    RDW 11.2 (*) 11.6 - 15.1 % Final    MPV 9.9  8.9 - 12.7 fL Final    Platelets 118 (*) 149 - 390 Thousands/uL Final    nRBC 0  /100 WBCs Final    Segmented % 87 (*) 43 - 75 % Final    Immature Grans % 0  0 - 2 % Final    Lymphocytes % 5 (*) 14 - 44 % Final    Monocytes % 7  4 - 12 % Final    Eosinophils Relative 1  0 - 6 % Final    Basophils Relative 0  0 - 1 % Final    Absolute Neutrophils 5.88  1.85 - 7.62 Thousands/µL Final    Absolute Immature Grans 0.03  0.00 - 0.20 Thousand/uL Final    Absolute Lymphocytes 0.34 (*) 0.60 - 4.47 Thousands/µL Final    Absolute Monocytes 0.46  0.17 - 1.22 Thousand/µL Final    Eosinophils Absolute 0.06  0.00 - 0.61 Thousand/µL Final    Basophils Absolute 0.02  0.00 - 0.10 Thousands/µL Final   MAGNESIUM - Abnormal    Magnesium 1.7 (*) 1.9 - 2.7 mg/dL Final   COVID19, INFLUENZA A/B, RSV PCR, [facility] - Normal    SARS-CoV-2 Negative  Negative Final    INFLUENZA A PCR Negative  Negative Final    INFLUENZA B PCR Negative  Negative Final    RSV PCR Negative  Negative Final    Narrative:     This test has been performed using the CoV-2/Flu/RSV plus assay on the [employer] GeneXpert platform. This test has been validated by the  and verified by the performing laboratory.  "    This test is designed to amplify and detect the following: nucleocapsid (N), envelope (E), and RNA-dependent RNA polymerase (RdRP) genes of the SARS-CoV-2 genome; matrix (M), basic polymerase (PB2), and acidic protein (PA) segments of the influenza A genome; matrix (M) and non-structural protein (NS) segments of the influenza B genome, and the nucleocapsid genes of RSV A and RSV B.     Positive results are indicative of the presence of Flu A, Flu B, RSV, and/or SARS-CoV-2 RNA. Positive results for SARS-CoV-2 or suspected novel influenza should be reported to state, local, or federal health departments according to local reporting requirements.      All results should be assessed in conjunction with clinical presentation and other laboratory markers for clinical management.     FOR PEDIATRIC PATIENTS - copy/paste COVID Guidelines URL to browser: [email or web address]      LIPASE - Normal    Lipase 26  11 - 82 u/L Final   HS TROPONIN I 0HR - Normal    hs TnI 0hr 3  \"Refer to ACS Flowchart\"- see link ng/L Final    Comment:                                              Initial (time 0) result  If >=50 ng/L, Myocardial injury suggested ;  Type of myocardial injury and treatment strategy  to be determined.  If 5-49 ng/L, a delta result at 2 hours and or 4 hours will be needed to further evaluate.  If <4 ng/L, and chest pain has been >3 hours since onset, patient may qualify for discharge based on the HEART score in the ED.  If <5 ng/L and <3hours since onset of chest pain, a delta result at 2 hours will be needed to further evaluate.    HS Troponin 99th Percentile URL of a Health Population=12 ng/L with a 95% Confidence Interval of 8-18 ng/L.    Second Troponin (time 2 hours)  If calculated delta >= 20 ng/L,  Myocardial injury suggested ; Type of myocardial injury and treatment strategy to be determined.  If 5-49 ng/L and the calculated delta is 5-19 ng/L, consult " medical service for evaluation.  Continue evaluation for ischemia on ecg and other possible etiology and repeat hs troponin at 4 hours.  If delta is <5 ng/L at 2 hours, consider discharge based on risk stratification via the HEART score (if in ED), or JAMESON risk score in IP/Observation.    HS Troponin 99th Percentile URL of a Health Population=12 ng/L with a 95% Confidence Interval of 8-18 ng/L.   B-TYPE NATRIURETIC PEPTIDE (BNP) - Normal    BNP 17  0 - 100 pg/mL Final   COMPREHENSIVE METABOLIC PANEL    Sodium 144  135 - 147 mmol/L Final    Potassium 3.7  3.5 - 5.3 mmol/L Final    Chloride 107  96 - 108 mmol/L Final    CO2 28  21 - 32 mmol/L Final    ANION GAP 9  4 - 13 mmol/L Final    BUN 19  5 - 25 mg/dL Final    Creatinine 0.98  0.60 - 1.30 mg/dL Final    Comment: Standardized to IDMS reference method    Glucose 100  65 - 140 mg/dL Final    Comment: If the patient is fasting, the ADA then defines impaired fasting glucose as > 100 mg/dL and diabetes as > or equal to 123 mg/dL.    Calcium 9.2  8.4 - 10.2 mg/dL Final    AST 19  13 - 39 U/L Final    ALT 12  7 - 52 U/L Final    Comment: Specimen collection should occur prior to Sulfasalazine administration due to the potential for falsely depressed results.     Alkaline Phosphatase 46  34 - 104 U/L Final    Total Protein 6.9  6.4 - 8.4 g/dL Final    Albumin 4.3  3.5 - 5.0 g/dL Final    Total Bilirubin 0.89  0.20 - 1.00 mg/dL Final    Comment: Use of this assay is not recommended for patients undergoing treatment with eltrombopag due to the potential for falsely elevated results.  N-acetyl-p-benzoquinone imine (metabolite of Acetaminophen) will generate erroneously low results in samples for patients that have taken an overdose of Acetaminophen.    eGFR 93  ml/min/1.73sq m Final    Narrative:     National Kidney Disease Foundation guidelines for Chronic Kidney Disease (CKD):     Stage 1 with normal or high GFR (GFR > 90 mL/min/1.73 square meters)    Stage 2 Mild CKD  (GFR = 60-89 mL/min/1.73 square meters)    Stage 3A Moderate CKD (GFR = 45-59 mL/min/1.73 square meters)    Stage 3B Moderate CKD (GFR = 30-44 mL/min/1.73 square meters)    Stage 4 Severe CKD (GFR = 15-29 mL/min/1.73 square meters)    Stage 5 End Stage CKD (GFR <15 mL/min/1.73 square meters)  Note: GFR calculation is accurate only with a steady state creatinine     Patient is a 44-year-old male seen in the emergency department with concern for nausea/vomiting/diarrhea.  EKG was obtained and noted, which showed no definite evidence of arrhythmia or ischemia.  Chest x-ray showed no infiltrate or pneumothorax.  COVID-19/influenza/RSV swab was ordered in the emergency department, and these tests were negative.  Patient was treated with medication for symptom control, with good effect.  Laboratory evaluation remarkable for white blood cell count of 6.79, 87% segmented neutrophils, elevated MCV of 101.  Patient is afebrile, and appears well-hydrated, with a benign abdominal exam.  Evaluation is not consistent with appendicitis, diverticulitis, pancreatitis, cholecystitis, or bowel obstruction.  Evaluation is suggestive of gastroenteritis, possibly viral in etiology.  Plan to have patient follow up with PCP/outpatient providers.  Patient stable for discharge.  Discharge instructions were reviewed with patient/staff.

## 2025-01-02 ENCOUNTER — TELEPHONE (OUTPATIENT)
Dept: INTERNAL MEDICINE CLINIC | Facility: CLINIC | Age: 45
End: 2025-01-02

## 2025-01-02 LAB
ATRIAL RATE: 70 BPM
P AXIS: 73 DEGREES
PR INTERVAL: 144 MS
QRS AXIS: 63 DEGREES
QRSD INTERVAL: 76 MS
QT INTERVAL: 356 MS
QTC INTERVAL: 384 MS
T WAVE AXIS: 57 DEGREES
VENTRICULAR RATE: 70 BPM

## 2025-01-02 PROCEDURE — 93010 ELECTROCARDIOGRAM REPORT: CPT | Performed by: INTERNAL MEDICINE

## 2025-01-02 NOTE — TELEPHONE ENCOUNTER
Patient was seen in ED on 12/31 and requires an ED f/u. [facility] is requesting that patient have a virtual appt. Patient is scheduled with Dr. Barry on 1/14 at 2pm.

## 2025-01-03 NOTE — TELEPHONE ENCOUNTER
Lvm explaining that appointment isnt necessary if patient is no longer showing any symptoms. If so, appointment must be in office.

## 2025-01-23 ENCOUNTER — OFFICE VISIT (OUTPATIENT)
Dept: INTERNAL MEDICINE CLINIC | Facility: CLINIC | Age: 45
End: 2025-01-23

## 2025-01-23 VITALS — SYSTOLIC BLOOD PRESSURE: 97 MMHG | DIASTOLIC BLOOD PRESSURE: 65 MMHG | TEMPERATURE: 98.1 F | HEART RATE: 55 BPM

## 2025-01-23 DIAGNOSIS — G80.0 SPASTIC QUADRIPLEGIC CEREBRAL PALSY (HCC): Primary | ICD-10-CM

## 2025-01-23 DIAGNOSIS — F81.89 NON-VERBAL LEARNING DISORDER: ICD-10-CM

## 2025-01-23 DIAGNOSIS — A08.4 VIRAL GASTROENTERITIS: ICD-10-CM

## 2025-01-23 NOTE — PROGRESS NOTES
INTERNAL MEDICINE FOLLOW-UP OFFICE VISIT  [facility]  [address]  [place] [address]    NAME: Caesar Lopez  AGE: 44 y.o. SEX: male    DATE OF ENCOUNTER: 1/23/2025    Assessment and Plan     1. 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HCC) (Primary)  2. Non-verbal learning disorder  Nonverbal, largely wheelchair-dependent at baseline  Family active in care  Dependent on all IADLs and most ADLS   No need for refills at this time  Lives with family but also resides in Day home   Accompanied by Day home   No new behavioral concerns reported    3. Viral gastroenteritis  Was seen in ED 12/31 for n/v/d  Workup unremarkable with exception of low mag, repleted in ED.  Etiology thought to be viral  Symptoms now reportedly resolved  Continue supportive measures, strict ED/return precautions provided    No orders of the defined types were placed in this encounter.            Chief Complaint     Chief Complaint   Patient presents with    Follow-up              History of Present Illness     45yo M with hx of Spastic quadriplegic cerebral palsy, non-verbal learning disorder, wheelchair dependence. Presenting for ED follow up.   Was seen in ED 12/31 for n/v/d. Workup unremarkable with exception of low mag, repleted in ED. Etiology was thought to be viral and patient was discharged home. Symptoms now reportedly resolved  Family active in care. Dependent on all IADLs and most ADLS . Resides in Day home. Accompanied by Day home . No new behavioral concerns reported          The following portions of the patient's history were reviewed and updated as appropriate: allergies, current medications, past family history, past medical history, past social history, past surgical history and problem list.    Review of Systems     Review of Systems  All ROS negative unless otherwise stated in the HPI    Active Problem List     Patient Active Problem List   Diagnosis     Cerebral palsy (HCC)    Communication disability    Developmental delay    Dextroscoliosis    Foot drop, left    Neutropenia (HCC)    Mental retardation    Non-verbal learning disorder    Thrombocytopenia (HCC)    Chronic pain of right knee    Gait abnormality    Annual physical exam    Encounter for immunization    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HCC)       Objective     BP 97/65 (BP Location: Right arm, Patient Position: Sitting, Cuff Size: Adult)   Pulse 55   Temp 98.1 °F (36.7 °C) (Temporal)     Physical Exam  Constitutional:       General: He is not in acute distress.     Appearance: Normal appearance. He is not ill-appearing or toxic-appearing.   HENT:      Nose: Nose normal.      Mouth/Throat:      Mouth: Mucous membranes are moist.   Eyes:      Conjunctiva/sclera: Conjunctivae normal.   Cardiovascular:      Rate and Rhythm: Normal rate and regular rhythm.      Pulses: Normal pulses.      Heart sounds: Normal heart sounds. No murmur heard.     No friction rub. No gallop.   Pulmonary:      Effort: Pulmonary effort is normal.      Breath sounds: Normal breath sounds. No wheezing or rhonchi.   Abdominal:      General: There is no distension.      Palpations: Abdomen is soft.      Tenderness: There is no abdominal tenderness.   Musculoskeletal:      Right lower leg: No edema.      Left lower leg: No edema.   Skin:     Capillary Refill: Capillary refill takes less than 2 seconds.      Findings: No rash.   Neurological:      Mental Status: He is alert and oriented to person, place, and time. Mental status is at baseline.      Comments: Nonverbal at baseline     Pertinent Laboratory/Diagnostic Studies:  CBC:   Lab Results   Component Value Date/Time    WBC 6.79 12/31/2024 07:19 PM    WBC 2.22 (L) 10/13/2014 09:00 AM    RBC 4.36 12/31/2024 07:19 PM    RBC 4.63 10/13/2014 09:00 AM    HGB 14.8 12/31/2024 07:19 PM    HGB 15.2 10/13/2014 09:00 AM    HCT 44.1 12/31/2024 07:19 PM    HCT 43.0 10/13/2014 09:00 AM      (H) 12/31/2024 07:19 PM    MCV 93 10/13/2014 09:00 AM    MCH 33.9 12/31/2024 07:19 PM    MCH 32.8 10/13/2014 09:00 AM    MCHC 33.6 12/31/2024 07:19 PM    MCHC 35.3 10/13/2014 09:00 AM    RDW 11.2 (L) 12/31/2024 07:19 PM    RDW 12.0 10/13/2014 09:00 AM    MPV 9.9 12/31/2024 07:19 PM    MPV 11.6 10/13/2014 09:00 AM     (L) 12/31/2024 07:19 PM     10/13/2014 09:00 AM    NRBC 0 12/31/2024 07:19 PM    NEUTOPHILPCT 87 (H) 12/31/2024 07:19 PM    NEUTOPHILPCT 38 (L) 10/13/2014 09:00 AM    LYMPHOPCT 5 (L) 12/31/2024 07:19 PM    LYMPHOPCT 49 (H) 10/13/2014 09:00 AM    MONOPCT 7 12/31/2024 07:19 PM    MONOPCT 10 10/13/2014 09:00 AM    EOSPCT 1 12/31/2024 07:19 PM    EOSPCT 2 10/13/2014 09:00 AM    BASOPCT 0 12/31/2024 07:19 PM    BASOPCT 1 10/13/2014 09:00 AM    NEUTROABS 5.88 12/31/2024 07:19 PM    NEUTROABS 0.84 (L) 10/13/2014 09:00 AM    LYMPHSABS 0.34 (L) 12/31/2024 07:19 PM    LYMPHSABS 1.09 10/13/2014 09:00 AM    MONOSABS 0.46 12/31/2024 07:19 PM    MONOSABS 0.22 10/13/2014 09:00 AM    EOSABS 0.06 12/31/2024 07:19 PM    EOSABS 0.04 10/13/2014 09:00 AM     Chemistry Profile:   Lab Results   Component Value Date/Time    K 3.7 12/31/2024 07:19 PM    K 3.6 12/02/2024 01:25 PM     12/31/2024 07:19 PM     12/02/2024 01:25 PM    CO2 28 12/31/2024 07:19 PM    CO2 30 12/02/2024 01:25 PM    BUN 19 12/31/2024 07:19 PM    BUN 16 12/02/2024 01:25 PM    CREATININE 0.98 12/31/2024 07:19 PM    CREATININE 0.83 12/02/2024 01:25 PM    GLUC 100 12/31/2024 07:19 PM    GLUC 72 12/02/2024 01:25 PM    GLUF 81 01/02/2024 11:12 AM    CALCIUM 9.2 12/31/2024 07:19 PM    CALCIUM 9.4 12/02/2024 01:25 PM    MG 1.7 (L) 12/31/2024 07:19 PM    AST 19 12/31/2024 07:19 PM    AST 17 12/02/2024 01:25 PM    ALT 12 12/31/2024 07:19 PM    ALT 10 12/02/2024 01:25 PM    ALKPHOS 46 12/31/2024 07:19 PM    ALKPHOS 45 12/02/2024 01:25 PM    EGFR 93 12/31/2024 07:19 PM    EGFR 111 12/02/2024 01:25 PM     Health Maintenance: No results  "for input(s): \"PSA\", \"HEPCAB\" in the last 8784 hours.    Current Medications     Current Outpatient Medications:     acetaminophen (TYLENOL) 160 mg/5 mL liquid, Take 24.2 mL (774.4 mg total) by mouth every 4 (four) hours as needed for mild pain, Disp: 473 mL, Rfl: 3    carbamide peroxide (DEBROX) 6.5 % otic solution, Administer 5 drops into both ears 2 (two) times a day, Disp: 15 mL, Rfl: 0    Diclofenac Sodium (VOLTAREN) 1 %, Apply 2 g topically 4 (four) times a day, Disp: 150 g, Rfl: 3    ergocalciferol (VITAMIN D2) 50,000 units, Take 1 capsule (50,000 Units total) by mouth once a week, Disp: 8 capsule, Rfl: 1    ibuprofen (MOTRIN) 100 mg/5 mL suspension, Take 10 mL (200 mg total) by mouth every 4 (four) hours as needed for mild pain or moderate pain, Disp: 473 mL, Rfl: 5    Misc. Devices (WALKER) MISC, Use, Disp: , Rfl:     Multiple Vitamins-Minerals (Multivitamin) liquid, Take 5 mL by mouth in the morning, Disp: 240 mL, Rfl: 6    Health Maintenance     Health Maintenance   Topic Date Due    Influenza Vaccine (1) 09/01/2024    COVID-19 Vaccine (3 - 2024-25 season) 09/01/2024    Annual Physical  01/02/2025    Depression Screening  04/02/2025    BMI: Adult  10/31/2025    Zoster Vaccine (1 of 2) 12/05/2030    DTaP,Tdap,and Td Vaccines (2 - Td or Tdap) 01/02/2034    HIV Screening  Completed    Hepatitis C Screening  Completed    Meningococcal B Vaccine  Aged Out    RSV Vaccine age 0-20 Months  Aged Out    Pneumococcal Vaccine: Pediatrics (0 to 5 Years) and At-Risk Patients (6 to 64 Years)  Aged Out    HIB Vaccine  Aged Out    IPV Vaccine  Aged Out    Hepatitis A Vaccine  Aged Out    Meningococcal ACWY Vaccine  Aged Out    HPV Vaccine  Aged Out     Immunization History   Administered Date(s) Administered    COVID-19 PFIZER VACCINE 0.3 ML IM 04/17/2021, 05/08/2021    INFLUENZA 11/01/2015, 10/25/2017    Influenza Quadrivalent Preservative Free 3 years and older IM 10/06/2016    Influenza Split 09/01/2014    Influenza, " injectable, quadrivalent, preservative free 0.5 mL 01/19/2023, 01/02/2024    Influenza, seasonal, injectable 10/13/2014, 10/09/2015    Tdap 01/02/2024    Tuberculin Skin Test-PPD Intradermal 03/23/2022         ----------------------------------------------------  Richardson Hylton DO, PGY-3  Internal Medicine Residency   [facility]

## 2025-02-17 ENCOUNTER — TELEPHONE (OUTPATIENT)
Dept: INTERNAL MEDICINE CLINIC | Facility: CLINIC | Age: 45
End: 2025-02-17

## 2025-02-17 NOTE — TELEPHONE ENCOUNTER
Patient called and needs a script for  Prosthetics to order him both left and right braces. Not sure if they can be repaired any more but the script has to braces or both left and right  Please call patient when done

## 2025-02-20 ENCOUNTER — TELEPHONE (OUTPATIENT)
Dept: INTERNAL MEDICINE CLINIC | Facility: CLINIC | Age: 45
End: 2025-02-20

## 2025-02-20 NOTE — TELEPHONE ENCOUNTER
Folder Color-Blue     Name of Form-[employer] Manual wheelchair     Form to be filled out by-Yoly     Form to be Faxed [phone]     Patient made aware of 10 business day policy.

## 2025-02-24 DIAGNOSIS — R26.9 GAIT ABNORMALITY: ICD-10-CM

## 2025-02-24 DIAGNOSIS — G80.0 SPASTIC QUADRIPLEGIC CEREBRAL PALSY (HCC): Primary | ICD-10-CM

## 2025-02-24 DIAGNOSIS — M21.372 FOOT DROP, LEFT: ICD-10-CM

## 2025-02-24 NOTE — TELEPHONE ENCOUNTER
Placed order; covers for both LE. Please print for me to sign, then may fax to [facility]

## 2025-03-10 ENCOUNTER — TELEPHONE (OUTPATIENT)
Dept: INTERNAL MEDICINE CLINIC | Facility: CLINIC | Age: 45
End: 2025-03-10

## 2025-03-10 NOTE — TELEPHONE ENCOUNTER
Folder Color: blue     Name of Form: fax transmittal form      Form to be filled out by: Dr Frederick     Form to be Faxed: [phone]     Patient made aware of 10 business day policy.

## 2025-03-26 ENCOUNTER — TELEPHONE (OUTPATIENT)
Dept: INTERNAL MEDICINE CLINIC | Facility: CLINIC | Age: 45
End: 2025-03-26

## 2025-03-26 NOTE — TELEPHONE ENCOUNTER
Patient's brother Kiko Lopez [phone] - came in stating that patient needs a letter from pcp on Letterhead with a real signature stating that his unable to to serve. Any further questions please contact brother

## 2025-03-26 NOTE — TELEPHONE ENCOUNTER
Folder Color- Blue     Name of Form- [employer] Cert and Plan of Car     Form to be filled out by- Dr. Frederick     Form to be Faxed [phone]    Patient made aware of 10 business day policy.

## 2025-03-27 NOTE — TELEPHONE ENCOUNTER
1st Attempt - Called Kiko [phone]. Left message to clarify with more details, what the letter is for.

## 2025-03-28 NOTE — TELEPHONE ENCOUNTER
2nd Attempt-  Called Kiko [phone]. Left message to clarify with more details, what the letter is for

## 2025-03-31 NOTE — TELEPHONE ENCOUNTER
3rd Attempt - Called Kiko [phone]. Left message to clarify with more details, what the letter is for.

## 2025-04-03 NOTE — TELEPHONE ENCOUNTER
Patients brother Kiko lvm asking for status of letter.    I returned his call and lvm. I explained to patient that we needed clarity on what is needed before letter is completed. I advised him that if he called back and didn't speak to anyone to specify what is needed in the vm.

## 2025-04-03 NOTE — TELEPHONE ENCOUNTER
Patients brothkannan Hoover left a vm and upon returning call I left a message for brother. I asked if he could please call us back and if he gets out voicemail to leave specific details about what is needed for the letter patient needs

## 2025-04-04 NOTE — TELEPHONE ENCOUNTER
Patient's brother Kiko called in to give details regarding a letter that needs to be filled out. Kiko stated that his brother does not have the ability to give consent because of his condition, and the court needs consent from all four siblings for Kiko to handle their parent's estate, as both parents have passed away. The court is requesting a document that says patient cannot give consent because of his condition, that way they can bypass the need for patient's consent. Patient does not have POA and would not be able to use that to give consent.    Kiko stated the letter has to have a letterhead from the doctor's office only, and the signature from Dr. Frederick cannot be generated, it must be handwritten.

## 2025-04-22 ENCOUNTER — TELEPHONE (OUTPATIENT)
Dept: INTERNAL MEDICINE CLINIC | Facility: CLINIC | Age: 45
End: 2025-04-22

## 2025-04-22 NOTE — TELEPHONE ENCOUNTER
Folder Color- Blue     Name of Form- [facility] missing forms     Form to be filled out by- Dr. Frederick     Form to be Faxed [phone]    Patient made aware of 10 business day policy.

## 2025-04-22 NOTE — TELEPHONE ENCOUNTER
Late Entry-On 4/21/25 received a call from [employer] requesting the Clinical Certification and Plan Of Care forms were sent on 3/25/2025 and completed on 4/01/25,    On 4/21/25 I faxed them on 4/21/25 with confirmation received okay.re-scanned into chart.

## 2025-05-01 ENCOUNTER — TELEPHONE (OUTPATIENT)
Dept: INTERNAL MEDICINE CLINIC | Facility: CLINIC | Age: 45
End: 2025-05-01

## 2025-05-01 ENCOUNTER — OFFICE VISIT (OUTPATIENT)
Dept: INTERNAL MEDICINE CLINIC | Facility: CLINIC | Age: 45
End: 2025-05-01

## 2025-05-01 VITALS — DIASTOLIC BLOOD PRESSURE: 69 MMHG | SYSTOLIC BLOOD PRESSURE: 101 MMHG | TEMPERATURE: 98 F | HEART RATE: 61 BPM

## 2025-05-01 DIAGNOSIS — G80.0 SPASTIC QUADRIPLEGIC CEREBRAL PALSY (HCC): ICD-10-CM

## 2025-05-01 DIAGNOSIS — Z00.00 ANNUAL PHYSICAL EXAM: Primary | ICD-10-CM

## 2025-05-01 DIAGNOSIS — Z13.9 SCREENING DUE: ICD-10-CM

## 2025-05-01 NOTE — ASSESSMENT & PLAN NOTE
Nonverbal and generally spends time in wheelchair at baseline  Family very active in care     Dependent on all IADLs and most ADLS (occasionally able to feed self and signal when need bathroom)  Given info for free [facility] and PT referral  Given [facility] referral for PT as pt meets criteria, gave pt family both [facility] numbers to contact and make appointments  Pt BM regular and eating more  No need for refills at this time  Patient does occasionally ambulate with walker, continue home PT to improve strength and mobility/ROM  F/u 6 months annual physical  Declines filling out polst form at this time  Continue attending day program  Signed and copied paperwork summarizing doctor's visit needed by ; also given AVS upon check out

## 2025-05-01 NOTE — PATIENT INSTRUCTIONS
"Patient Education     Routine physical for adults   The Basics   Written by the doctors and editors at [facility]   What is a physical? -- A physical is a routine visit, or \"check-up,\" with your doctor. You might also hear it called a \"wellness visit\" or \"preventive visit.\"  During each visit, the doctor will:   Ask about your physical and mental health   Ask about your habits, behaviors, and lifestyle   Do an exam   Give you vaccines if needed   Talk to you about any medicines you take   Give advice about your health   Answer your questions  Getting regular check-ups is an important part of taking care of your health. It can help your doctor find and treat any problems you have. But it's also important for preventing health problems.  A routine physical is different from a \"sick visit.\" A sick visit is when you see a doctor because of a health concern or problem. Since physicals are scheduled ahead of time, you can think about what you want to ask the doctor.  How often should I get a physical? -- It depends on your age and health. In general, for people age 21 years and older:   If you are younger than 50 years, you might be able to get a physical every 3 years.   If you are 50 years or older, your doctor might recommend a physical every year.  If you have an ongoing health condition, like diabetes or high blood pressure, your doctor will probably want to see you more often.  What happens during a physical? -- In general, each visit will include:   Physical exam - The doctor or nurse will check your height, weight, heart rate, and blood pressure. They will also look at your eyes and ears. They will ask about how you are feeling and whether you have any symptoms that bother you.   Medicines - It's a good idea to bring a list of all the medicines you take to each doctor visit. Your doctor will talk to you about your medicines and answer any questions. Tell them if you are having any side effects that bother you. You " "should also tell them if you are having trouble paying for any of your medicines.   Habits and behaviors - This includes:   Your diet   Your exercise habits   Whether you smoke, drink alcohol, or use drugs   Whether you are sexually active   Whether you feel safe at home  Your doctor will talk to you about things you can do to improve your health and lower your risk of health problems. They will also offer help and support. For example, if you want to quit smoking, they can give you advice and might prescribe medicines. If you want to improve your diet or get more physical activity, they can help you with this, too.   Lab tests, if needed - The tests you get will depend on your age and situation. For example, your doctor might want to check your:   Cholesterol   Blood sugar   Iron level   Vaccines - The recommended vaccines will depend on your age, health, and what vaccines you already had. Vaccines are very important because they can prevent certain serious or deadly infections.   Discussion of screening - \"Screening\" means checking for diseases or other health problems before they cause symptoms. Your doctor can recommend screening based on your age, risk, and preferences. This might include tests to check for:   Cancer, such as breast, prostate, cervical, ovarian, colorectal, prostate, lung, or skin cancer   Sexually transmitted infections, such as chlamydia and gonorrhea   Mental health conditions like depression and anxiety  Your doctor will talk to you about the different types of screening tests. They can help you decide which screenings to have. They can also explain what the results might mean.   Answering questions - The physical is a good time to ask the doctor or nurse questions about your health. If needed, they can refer you to other doctors or specialists, too.  Adults older than 65 years often need other care, too. As you get older, your doctor will talk to you about:   How to prevent falling at " home   Hearing or vision tests   Memory testing   How to take your medicines safely   Making sure that you have the help and support you need at home  All topics are updated as new evidence becomes available and our peer review process is complete.  This topic retrieved from [employer] on: May 02, 2024.  Topic 329013 Version 1.0  Release: 32.4.3 - C32.122  © 2024 UpToDate, Inc. and/or its affiliates. All rights reserved.  Consumer Information Use and Disclaimer   Disclaimer: This generalized information is a limited summary of diagnosis, treatment, and/or medication information. It is not meant to be comprehensive and should be used as a tool to help the user understand and/or assess potential diagnostic and treatment options. It does NOT include all information about conditions, treatments, medications, side effects, or risks that may apply to a specific patient. It is not intended to be medical advice or a substitute for the medical advice, diagnosis, or treatment of a health care provider based on the health care provider's examination and assessment of a patient's specific and unique circumstances. Patients must speak with a health care provider for complete information about their health, medical questions, and treatment options, including any risks or benefits regarding use of medications. This information does not endorse any treatments or medications as safe, effective, or approved for treating a specific patient. UpToDate, Inc. and its affiliates disclaim any warranty or liability relating to this information or the use thereof.The use of this information is governed by the Terms of Use, available at https://www.woltersOlocodeuwer.com/en/know/clinical-effectiveness-terms. 2024© UpToDate, Inc. and its affiliates and/or licensors. All rights reserved.  Copyright   © 2024 UpToDate, Inc. and/or its affiliates. All rights reserved.

## 2025-05-01 NOTE — PROGRESS NOTES
Adult Annual Physical  Name: Caesar Lopez      : 1980      MRN: [id number]  Encounter Provider: Deborah Frederick DO  Encounter Date: 2025   Encounter department: [facility] [place]    :  Assessment & Plan  Screening due    Orders:    Vitamin D 25 hydroxy; Future    Annual physical exam         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HCC)  Nonverbal and generally spends time in wheelchair at baseline  Family very active in care     Dependent on all IADLs and most ADLS (occasionally able to feed self and signal when need bathroom)  Given info for free [facility] and PT referral  Given [facility] referral for PT as pt meets criteria, gave pt family both VNA numbers to contact and make appointments  Pt BM regular and eating more  No need for refills at this time  Patient does occasionally ambulate with walker, continue home PT to improve strength and mobility/ROM  F/u 6 months annual physical  Declines filling out polst form at this time  Continue attending day program  Signed and copied paperwork summarizing doctor's visit needed by ; also given AVS upon check out              Preventive Screenings:    - Prostate cancer screening: screening not indicated          History of Present Illness     Adult Annual Physical:  Patient presents for annual physical. PMHx of spastic quadriplegia 2/2 CP and general non-verbal though does phonate with special family language presents today for annual physical. He presents with his day caretaker, Shaun. Patient is jovial and phonating appropriately. Shaun states that no concerns, and overall negative KIRK Hoover (brother) and his wife, Gina  Brother, Edinson (pronounced similarly to leon)  .     Diet and Physical Activity:  - Diet/Nutrition: well balanced diet.  - Exercise: no formal exercise.    Depression Screening:  - PHQ-2 Score: 0    General Health:  - Sleep: sleeps well and 7-8 hours of sleep on average.  - Hearing: normal  hearing bilateral ears.  - Vision: most recent eye exam > 1 year ago and no vision problems.  - Dental: brushes teeth twice daily.    Review of Systems   Unable to perform ROS: Patient nonverbal     Medical History Reviewed by provider this encounter:     .  Current Outpatient Medications on File Prior to Visit   Medication Sig Dispense Refill    acetaminophen (TYLENOL) 160 mg/5 mL liquid Take 24.2 mL (774.4 mg total) by mouth every 4 (four) hours as needed for mild pain 473 mL 3    carbamide peroxide (DEBROX) 6.5 % otic solution Administer 5 drops into both ears 2 (two) times a day 15 mL 0    Diclofenac Sodium (VOLTAREN) 1 % Apply 2 g topically 4 (four) times a day 150 g 3    ergocalciferol (VITAMIN D2) 50,000 units Take 1 capsule (50,000 Units total) by mouth once a week 8 capsule 1    ibuprofen (MOTRIN) 100 mg/5 mL suspension Take 10 mL (200 mg total) by mouth every 4 (four) hours as needed for mild pain or moderate pain 473 mL 5    Misc. Devices (WALKER) MISC Use      Multiple Vitamins-Minerals (Multivitamin) liquid Take 5 mL by mouth in the morning 240 mL 6     No current facility-administered medications on file prior to visit.      Social History     Tobacco Use    Smoking status: Never    Smokeless tobacco: Never   Vaping Use    Vaping status: Never Used   Substance and Sexual Activity    Alcohol use: No    Drug use: No    Sexual activity: Never       Objective   /69 (BP Location: Right arm, Patient Position: Sitting, Cuff Size: Large)   Pulse 61   Temp 98 °F (36.7 °C) (Temporal)     Physical Exam  Vitals and nursing note reviewed.   Constitutional:       General: He is not in acute distress.     Appearance: He is well-developed. He is not ill-appearing.      Comments: Presents in wheelchair, baseline   HENT:      Nose: Nose normal. No congestion.      Mouth/Throat:      Mouth: Mucous membranes are moist.   Eyes:      General: No scleral icterus.     Conjunctiva/sclera: Conjunctivae normal.    Cardiovascular:      Rate and Rhythm: Normal rate and regular rhythm.      Heart sounds: Normal heart sounds. No murmur heard.  Pulmonary:      Effort: Pulmonary effort is normal. No respiratory distress.      Breath sounds: Normal breath sounds. No wheezing, rhonchi or rales.   Abdominal:      General: Bowel sounds are normal.      Palpations: Abdomen is soft.      Tenderness: There is no abdominal tenderness. There is no guarding or rebound.   Musculoskeletal:      Cervical back: Neck supple.      Right lower leg: No edema.      Left lower leg: No edema.   Skin:     General: Skin is warm and dry.      Capillary Refill: Capillary refill takes less than 2 seconds.   Neurological:      Mental Status: He is alert.      Comments: B/l leg contractures, braced; b/l arm contractures with muscle wasting in thenar eminence and temporal area   Psychiatric:         Mood and Affect: Mood normal.         Behavior: Behavior normal.      Comments: Phonates and special signing language with family, communicates well with family; mildly interactive with physician       Administrative Statements   I have spent a total time of 42 minutes in caring for this patient on the day of the visit/encounter including Prognosis, Risks and benefits of tx options, Instructions for management, Patient and family education, Importance of tx compliance, Risk factor reductions, Impressions, Counseling / Coordination of care, Documenting in the medical record, Reviewing/placing orders in the medical record (including tests, medications, and/or procedures), Obtaining or reviewing history  , and Communicating with other healthcare professionals .

## 2025-05-01 NOTE — TELEPHONE ENCOUNTER
Folder Color-Blue    Name of Form- Continuity of Care Form     Form to be filled out by-Dr Frederick    Form to be given to patient    Patient made aware of 10 business day policy.

## 2025-05-07 DIAGNOSIS — Z00.00 ANNUAL PHYSICAL EXAM: ICD-10-CM

## 2025-05-07 RX ORDER — CALCIUM CARB/VITAMIN D3/VIT K1 500-500-40
5 TABLET,CHEWABLE ORAL DAILY
Qty: 240 ML | Refills: 6 | Status: SHIPPED | OUTPATIENT
Start: 2025-05-07

## 2025-05-15 ENCOUNTER — APPOINTMENT (OUTPATIENT)
Dept: LAB | Age: 45
End: 2025-05-15
Attending: STUDENT IN AN ORGANIZED HEALTH CARE EDUCATION/TRAINING PROGRAM
Payer: COMMERCIAL

## 2025-05-15 DIAGNOSIS — Z13.9 SCREENING DUE: ICD-10-CM

## 2025-05-15 LAB — 25(OH)D3 SERPL-MCNC: 37 NG/ML (ref 30–100)

## 2025-05-15 PROCEDURE — 82306 VITAMIN D 25 HYDROXY: CPT

## 2025-05-15 PROCEDURE — 36415 COLL VENOUS BLD VENIPUNCTURE: CPT

## 2025-05-16 ENCOUNTER — RESULTS FOLLOW-UP (OUTPATIENT)
Dept: OTHER | Facility: HOSPITAL | Age: 45
End: 2025-05-16

## 2025-05-16 NOTE — RESULT ENCOUNTER NOTE
Vit D significantly improved. Now WNL. Continue daily supplementation 2000U daily. Sent my chart message with details/review

## 2025-05-22 ENCOUNTER — TELEPHONE (OUTPATIENT)
Dept: INTERNAL MEDICINE CLINIC | Facility: CLINIC | Age: 45
End: 2025-05-22

## 2025-05-22 NOTE — TELEPHONE ENCOUNTER
Folder Color: Blue     Name of Form: Clinical Certification and Plan of Care      Form to be filled out by: Yoly     Form to be Faxed: [phone]     Patient made aware of 10 day business policy.

## 2025-05-31 PROBLEM — Z13.9 SCREENING DUE: Status: RESOLVED | Noted: 2025-05-01 | Resolved: 2025-05-31

## 2025-06-23 PROBLEM — D36.7 DERMOID CYST OF HEAD: Status: ACTIVE | Noted: 2025-06-23

## 2025-06-23 NOTE — ASSESSMENT & PLAN NOTE
Baseline  Repeat CMP annually  Likely benign however if any changes, consider SPEP, UPEP and referral to heme-onc

## 2025-06-23 NOTE — ASSESSMENT & PLAN NOTE
Nonverbal, however phonates and individualized sign language  generally spends time in wheelchair when out, but regularly used rollator at home  Family very active in care     Dependent on all IADLs and most ADLS (occasionally able to feed self and signal when need bathroom)  Given info for free [facility] and PT referral  Given [facility] referral for PT as pt meets criteria, gave pt family both VNA numbers to contact and make appointments  Pt BM regular and eating more  No need for refills at this time  Patient does occasionally ambulate with walker, continue home PT to improve strength and mobility/ROM  F/u 6 months annual physical  Declines filling out polst form at this time  Continue attending day program  Signed and copied paperwork summarizing doctor's visit needed by ; also given AVS upon check out  Signed paper work attesting patient in need of guardianship

## 2025-06-23 NOTE — ASSESSMENT & PLAN NOTE
NEW DX    See HPI for further details  Ddx: lipoma vs dermoid cyst vs less likely infection vs less likely trauma vs less likely malignancy/mass  Continue conservative and supportive care. No red flag symptoms at this time, but reviewed ED precautions.

## 2025-06-23 NOTE — ASSESSMENT & PLAN NOTE
Stable  2/2 moderate/severe CP  Interactive and well-cared for by family members  Mother and father both passed, brothers are caregivers

## 2025-06-23 NOTE — PROGRESS NOTES
Adult Annual Physical  Name: Caesar Lopez      : 1980      MRN: [id number]  Encounter Provider: Deborah Frederick DO  Encounter Date: 2025   Encounter department: [facility] [place]    :  Assessment & Plan  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HCC)  Nonverbal, however phonates and individualized sign language  generally spends time in wheelchair when out, but regularly used rollator at home  Family very active in care     Dependent on all IADLs and most ADLS (occasionally able to feed self and signal when need bathroom)  Given info for free [facility] and PT referral  Given [facility] referral for PT as pt meets criteria, gave pt family both VNA numbers to contact and make appointments  Pt BM regular and eating more  No need for refills at this time  Patient does occasionally ambulate with walker, continue home PT to improve strength and mobility/ROM  F/u 6 months annual physical  Declines filling out polst form at this time  Continue attending day program  Signed and copied paperwork summarizing doctor's visit needed by ; also given AVS upon check out  Signed paper work attesting patient in need of guardianship              Non-verbal learning disorder  Stable  2/2 moderate/severe CP  Interactive and well-cared for by family members  Mother and father both passed, brothers are caregivers           Thrombocytopenia (HCC)  Baseline  Repeat CMP annually  Likely benign however if any changes, consider SPEP, UPEP and referral to heme-onc         Dermoid cyst of head  NEW DX    See HPI for further details  Ddx: lipoma vs dermoid cyst vs less likely infection vs less likely trauma vs less likely malignancy/mass  Continue conservative and supportive care. No red flag symptoms at this time, but reviewed ED precautions.               Preventive Screenings:  - Diabetes Screening: screening up-to-date  - Hepatitis C screening: screening up-to-date   - HIV screening:  screening up-to-date   - Lung cancer screening: screening not indicated   - Prostate cancer screening: screening not indicated          History of Present Illness   {?Quick Links Encounters * My Last Note * Last Note in Specialty * Snapshot * Since Last Visit * History :15368}  Adult Annual Physical:  Patient presents for annual physical. PMHx of spastic quadriplegia 2/2 CP and general non-verbal though does phonate with special family language presents today for annual physical. He presents with his day caretaker, Shaun. Patient is jovial and phonating appropriately. Shaun states that no concerns, and overall negative ROS    Kiko (brother) and his wife, Gina  Brother, Edinson (pronounced similarly to leon)  .     Diet and Physical Activity:  - Diet/Nutrition: well balanced diet.  - Exercise: no formal exercise.    General Health:  - Sleep: sleeps well and 7-8 hours of sleep on average.  - Hearing: normal hearing bilateral ears.  - Vision: most recent eye exam > 1 year ago and no vision problems.  - Dental: brushes teeth twice daily.    Review of Systems   Unable to perform ROS: Patient nonverbal     Medical History Reviewed by provider this encounter:     .  Current Outpatient Medications on File Prior to Visit   Medication Sig Dispense Refill   • acetaminophen (TYLENOL) 160 mg/5 mL liquid Take 24.2 mL (774.4 mg total) by mouth every 4 (four) hours as needed for mild pain 473 mL 3   • carbamide peroxide (DEBROX) 6.5 % otic solution Administer 5 drops into both ears 2 (two) times a day 15 mL 0   • Diclofenac Sodium (VOLTAREN) 1 % Apply 2 g topically 4 (four) times a day 150 g 3   • ergocalciferol (VITAMIN D2) 50,000 units Take 1 capsule (50,000 Units total) by mouth once a week 8 capsule 1   • ibuprofen (MOTRIN) 100 mg/5 mL suspension Take 10 mL (200 mg total) by mouth every 4 (four) hours as needed for mild pain or moderate pain 473 mL 5   • Misc. Devices (WALKER) MISC Use     • Multiple Vitamins-Minerals  (Multivitamin) liquid Take 5 mL by mouth in the morning 240 mL 6     No current facility-administered medications on file prior to visit.      Social History     Tobacco Use   • Smoking status: Never   • Smokeless tobacco: Never   Vaping Use   • Vaping status: Never Used   Substance and Sexual Activity   • Alcohol use: No   • Drug use: No   • Sexual activity: Never       Objective {?Quick Links Trend Vitals * Enter New Vitals * Results Review * Timeline (Adult) * Labs * Imaging * Cardiology * Procedures * Lung Cancer Screening * Surgical eConsent :78053}  There were no vitals taken for this visit.    Physical Exam  Vitals and nursing note reviewed.   Constitutional:       General: He is not in acute distress.     Appearance: He is well-developed. He is not ill-appearing.      Comments: Presents in wheelchair, baseline   HENT:      Nose: Nose normal. No congestion.      Mouth/Throat:      Mouth: Mucous membranes are moist.     Eyes:      General: No scleral icterus.     Conjunctiva/sclera: Conjunctivae normal.       Cardiovascular:      Rate and Rhythm: Normal rate and regular rhythm.      Heart sounds: Normal heart sounds. No murmur heard.  Pulmonary:      Effort: Pulmonary effort is normal. No respiratory distress.      Breath sounds: Normal breath sounds. No wheezing, rhonchi or rales.   Abdominal:      General: Bowel sounds are normal.      Palpations: Abdomen is soft.      Tenderness: There is no abdominal tenderness. There is no guarding or rebound.     Musculoskeletal:      Cervical back: Neck supple.      Right lower leg: No edema.      Left lower leg: No edema.     Skin:     General: Skin is warm and dry.      Capillary Refill: Capillary refill takes less than 2 seconds.     Neurological:      Mental Status: He is alert.      Comments: B/l leg contractures, braced; b/l arm contractures with muscle wasting in thenar eminence and temporal area   Psychiatric:         Mood and Affect: Mood normal.          "Behavior: Behavior normal.      Comments: Phonates and special signing language with family, communicates well with family; mildly interactive with physician     Administrative Statements {?Quick Links Full Problem List * Level of Service * [facility]:65367}  I have spent a total time of 42 minutes in caring for this patient on the day of the visit/encounter including Prognosis, Risks and benefits of tx options, Instructions for management, Patient and family education, Importance of tx compliance, Risk factor reductions, Impressions, Counseling / Coordination of care, Documenting in the medical record, Reviewing/placing orders in the medical record (including tests, medications, and/or procedures), Obtaining or reviewing history  , and Communicating with other healthcare professionals .  PMHx of spastic quadriplegia 2/2 CP and general non-verbal though does phonate with special family language who presents today with brother, ***, for concern of \"head bump\" coming and going.    First noted:The pain is described as {quality:618}, and is {0-10:10163}/10 in intensity. Pain is located in the {anatomy; location abdomen:95755} {abdomen pain radiation:616}. Onset was {numbers; 0-10:43084} {unit:11} ago. Symptoms have been {course:17} since. Aggravating factors: {aggrav factors:619}.  Alleviating factors: {allev factors:620}. Associated symptoms: {assoc symptoms:621}. The patient denies {assoc symptoms:621}.       Kiko (brother) and his wife, Gina  Brother, Edinson (pronounced similarly to leon)      "

## 2025-06-23 NOTE — PROGRESS NOTES
Name: Caesar Lopez      : 1980      MRN: [id number]  Encounter Provider: Deborah Frederick DO  Encounter Date: 2025   [facility]    Assessment & Plan  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HCC)  Nonverbal, however phonates and individualized sign language  generally spends time in wheelchair when out, but regularly used rollator at home  Family very active in care     Dependent on all IADLs and most ADLS (occasionally able to feed self and signal when need bathroom)  Given info for free [facility] and PT referral  Given [facility] referral for PT as pt meets criteria, gave pt family both VNA numbers to contact and make appointments  Pt BM regular and eating more  No need for refills at this time  Patient does occasionally ambulate with walker, continue home PT to improve strength and mobility/ROM  F/u 6 months annual physical  Declines filling out polst form at this time  Continue attending day program  Signed and copied paperwork summarizing doctor's visit needed by ; also given AVS upon check out  Signed paper work attesting patient in need of guardianship       Non-verbal learning disorder  Interactive and well-cared for by family members  Mother and father both passed, brothers are caregivers  Stable        Thrombocytopenia (HCC)  Baseline  Repeat CMP annually  Likely benign however if any changes, consider SPEP, UPEP and referral to heme-onc       Dermoid cyst of head  NEW DX     See HPI for further details  Ddx: lipoma vs dermoid cyst vs less likely infection vs less likely trauma vs less likely malignancy/mass  Continue conservative and supportive care. No red flag symptoms at this time, but reviewed ED precautions  Encouraged caregiver, Tyler, and family to make note of any changes of size, especially if in regard to circumstances (after showering, hot weather, emotional triggers, etc)  Referral to derm         History of  "Present Illness     PMHx of spastic quadriplegia 2/2 CP and general non-verbal though does phonate with special family language who presents today with aid Shaun and brother, edinson virtually, for concern of \"head bump\" coming and going.    First noted:The pain is described as none, and is 0/10 in intensity. Onset was a few months ago. No associated symptoms such as headache, changes in vision, fevers, chills, trauma to area recently, or changes in body/hair/clothes soaps. Never happened to patient before. Pt does confirm via brother that did hit head as child in that area, but never hit head since that incident. Aggravating factors: unknown.  Alleviating factors: unknown. Associated symptoms: none. The patient denies chills, fever, headache, myalgias, and sweats.    Tyler is care aid and provides collateral similar to collateral/history provided by brother Edinson via virtual FT call.  Review of Systems   Constitutional:  Negative for chills and fever.   HENT:  Negative for congestion.    Eyes:  Negative for visual disturbance.   Respiratory:  Negative for cough and shortness of breath.    Cardiovascular:  Negative for chest pain and palpitations.   Gastrointestinal:  Negative for abdominal pain, constipation, diarrhea, nausea and vomiting.   Musculoskeletal:  Positive for gait problem (baseline).   Skin:  Positive for wound (bulding lesion on forehead). Negative for color change and rash.   Neurological:  Negative for dizziness, syncope, light-headedness and headaches.   Psychiatric/Behavioral:  Negative for dysphoric mood and sleep disturbance. The patient is not nervous/anxious.      Past Medical History[1]  Past Surgical History[2]  Family History[3]  Social History[4]  Medications[5]  Allergies   Allergen Reactions   • Chloroquine      Annotation - [id number]: ITCHY     Immunization History   Administered Date(s) Administered   • COVID-19 PFIZER VACCINE 0.3 ML IM 04/17/2021, 05/08/2021   • INFLUENZA 11/01/2015, " 10/25/2017   • Influenza Quadrivalent Preservative Free 3 years and older IM 10/06/2016   • Influenza Split 09/01/2014   • Influenza, injectable, quadrivalent, preservative free 0.5 mL 01/19/2023, 01/02/2024   • Influenza, seasonal, injectable 10/13/2014, 10/09/2015   • Tdap 01/02/2024   • Tuberculin Skin Test-PPD Intradermal 03/23/2022     Objective   /64 (BP Location: Right arm, Patient Position: Sitting, Cuff Size: Standard)   Pulse 64   Temp 98.6 °F (37 °C) (Tympanic)   Resp 20   SpO2 97%     Physical Exam  Vitals and nursing note reviewed.   Constitutional:       General: He is not in acute distress.     Appearance: He is well-developed. He is obese. He is not ill-appearing.      Comments: Presents in wheelchair, baseline   HENT:      Nose: Nose normal. No congestion.      Mouth/Throat:      Mouth: Mucous membranes are moist.     Eyes:      General: No scleral icterus.     Conjunctiva/sclera: Conjunctivae normal.       Cardiovascular:      Rate and Rhythm: Normal rate and regular rhythm.      Heart sounds: Normal heart sounds. No murmur heard.     No friction rub. No gallop.   Pulmonary:      Effort: Pulmonary effort is normal. No respiratory distress.      Breath sounds: Normal breath sounds. No wheezing, rhonchi or rales.   Abdominal:      General: Bowel sounds are normal.      Palpations: Abdomen is soft.      Tenderness: There is no abdominal tenderness. There is no guarding or rebound.     Musculoskeletal:      Cervical back: Neck supple.      Right lower leg: No edema.      Left lower leg: No edema.     Skin:     General: Skin is warm and dry.      Capillary Refill: Capillary refill takes less than 2 seconds.      Findings: Lesion (well defined,firm rubbery in nature, mobile, no overlying skin changes, circylar, about 1cm in diameter on forehead on L near hairline; no tenderness to palpation; does appear at end of childhood scar but not keloid in nature, likely subdermal layer) present.      Neurological:      Mental Status: He is alert.      Comments: B/l leg contractures, braced; b/l arm contractures with muscle wasting in thenar eminence and temporal area   Psychiatric:         Mood and Affect: Mood normal.         Behavior: Behavior normal.         Thought Content: Thought content normal.         Judgment: Judgment normal.      Comments: Phonates and special signing language with family, communicates well with family; mildly interactive with physician       I have spent a total time of 40 minutes in caring for this patient on the day of the visit/encounter including Diagnostic results, Prognosis, Risks and benefits of tx options, Instructions for management, Patient and family education, Importance of tx compliance, Risk factor reductions, Impressions, Counseling / Coordination of care, Documenting in the medical record, Reviewing/placing orders in the medical record (including tests, medications, and/or procedures), Obtaining or reviewing history  , and Communicating with other healthcare professionals            [1]  Past Medical History:  Diagnosis Date   • Cerebral palsy (HCC)    • Leukopenia     Last Assessed:  10/9/15   • Thrombocytopenia (HCC)     Last Assessed:  10/13/14   • Tired 11/29/2019   [2]  No past surgical history on file.  [3]  No family history on file.  [4]  Social History  Tobacco Use   • Smoking status: Never   • Smokeless tobacco: Never   Vaping Use   • Vaping status: Never Used   Substance and Sexual Activity   • Alcohol use: No   • Drug use: No   • Sexual activity: Never   [5]  Current Outpatient Medications on File Prior to Visit   Medication Sig   • acetaminophen (TYLENOL) 160 mg/5 mL liquid Take 24.2 mL (774.4 mg total) by mouth every 4 (four) hours as needed for mild pain   • Diclofenac Sodium (VOLTAREN) 1 % Apply 2 g topically 4 (four) times a day   • ergocalciferol (VITAMIN D2) 50,000 units Take 1 capsule (50,000 Units total) by mouth once a week   • ibuprofen  (MOTRIN) 100 mg/5 mL suspension Take 10 mL (200 mg total) by mouth every 4 (four) hours as needed for mild pain or moderate pain   • Multiple Vitamins-Minerals (Multivitamin) liquid Take 5 mL by mouth in the morning   • carbamide peroxide (DEBROX) 6.5 % otic solution Administer 5 drops into both ears 2 (two) times a day   • Misc. Devices (WALKER) MISC Use

## 2025-06-23 NOTE — ASSESSMENT & PLAN NOTE
NEW DX     See HPI for further details  Ddx: lipoma vs dermoid cyst vs less likely infection vs less likely trauma vs less likely malignancy/mass  Continue conservative and supportive care. No red flag symptoms at this time, but reviewed ED precautions  Encouraged caregiver, Tyler, and family to make note of any changes of size, especially if in regard to circumstances (after showering, hot weather, emotional triggers, etc)  Referral to derm

## 2025-06-24 ENCOUNTER — TELEPHONE (OUTPATIENT)
Dept: INTERNAL MEDICINE CLINIC | Facility: CLINIC | Age: 45
End: 2025-06-24

## 2025-06-24 ENCOUNTER — OFFICE VISIT (OUTPATIENT)
Dept: INTERNAL MEDICINE CLINIC | Facility: CLINIC | Age: 45
End: 2025-06-24

## 2025-06-24 VITALS
TEMPERATURE: 98.6 F | DIASTOLIC BLOOD PRESSURE: 64 MMHG | HEART RATE: 64 BPM | SYSTOLIC BLOOD PRESSURE: 100 MMHG | RESPIRATION RATE: 20 BRPM | OXYGEN SATURATION: 97 %

## 2025-06-24 DIAGNOSIS — D36.7 DERMOID CYST OF HEAD: ICD-10-CM

## 2025-06-24 DIAGNOSIS — G80.0 SPASTIC QUADRIPLEGIC CEREBRAL PALSY (HCC): Primary | ICD-10-CM

## 2025-06-24 DIAGNOSIS — F81.89 NON-VERBAL LEARNING DISORDER: ICD-10-CM

## 2025-06-24 DIAGNOSIS — D69.6 THROMBOCYTOPENIA (HCC): ICD-10-CM

## 2025-06-24 PROCEDURE — 99214 OFFICE O/P EST MOD 30 MIN: CPT | Performed by: STUDENT IN AN ORGANIZED HEALTH CARE EDUCATION/TRAINING PROGRAM

## 2025-06-24 NOTE — TELEPHONE ENCOUNTER
Folder Color-Blue    Name of Form-Community Care form     Form to be filled out by-[email or web address]     On [employer] Keyboard     Patient made aware of 10 business day policy.

## 2025-06-25 ENCOUNTER — TELEPHONE (OUTPATIENT)
Dept: INTERNAL MEDICINE CLINIC | Facility: CLINIC | Age: 45
End: 2025-06-25

## 2025-06-25 NOTE — TELEPHONE ENCOUNTER
Folder Color: Blue     Name of Form: Clinical Certification and Plan of Care      Form to be filled out by: Yoly     Form to be Faxed: [phone]     Patient made aware of 10 day business policy.

## 2025-07-10 ENCOUNTER — TELEPHONE (OUTPATIENT)
Dept: INTERNAL MEDICINE CLINIC | Facility: CLINIC | Age: 45
End: 2025-07-10

## 2025-07-10 NOTE — TELEPHONE ENCOUNTER
Folder Color-Blue     Name of Form-Clinical Cert and POC 7/23-9/21      Form to be filled out by-Yoly     Form to be Faxed [phone]     Patient made aware of 10 business day policy.

## 2025-08-01 ENCOUNTER — TELEPHONE (OUTPATIENT)
Dept: INTERNAL MEDICINE CLINIC | Facility: CLINIC | Age: 45
End: 2025-08-01

## 2025-08-14 ENCOUNTER — TELEPHONE (OUTPATIENT)
Dept: INTERNAL MEDICINE CLINIC | Facility: CLINIC | Age: 45
End: 2025-08-14